# Patient Record
Sex: FEMALE | Race: WHITE | NOT HISPANIC OR LATINO | Employment: OTHER | URBAN - METROPOLITAN AREA
[De-identification: names, ages, dates, MRNs, and addresses within clinical notes are randomized per-mention and may not be internally consistent; named-entity substitution may affect disease eponyms.]

---

## 2018-03-09 ENCOUNTER — TELEPHONE (OUTPATIENT)
Dept: CARDIOLOGY | Facility: CLINIC | Age: 83
End: 2018-03-09

## 2018-03-12 ENCOUNTER — TELEPHONE (OUTPATIENT)
Dept: CARDIOLOGY | Facility: CLINIC | Age: 83
End: 2018-03-12

## 2018-03-12 RX ORDER — BUTALB/ACETAMINOPHEN/CAFFEINE 50-325-40
315 TABLET ORAL DAILY
COMMUNITY
Start: 2011-08-09 | End: 2018-03-16 | Stop reason: ENTERED-IN-ERROR

## 2018-03-12 RX ORDER — LEVOTHYROXINE SODIUM 100 UG/1
100 CAPSULE ORAL DAILY
COMMUNITY
Start: 2011-08-09

## 2018-03-12 RX ORDER — HYDROCHLOROTHIAZIDE 12.5 MG/1
12.5 TABLET ORAL DAILY
COMMUNITY
Start: 2011-08-09

## 2018-03-12 RX ORDER — GLUCOSAM/CHONDRO/HERB 149/HYAL 750-100 MG
1000 TABLET ORAL DAILY
COMMUNITY
Start: 2011-08-09 | End: 2018-03-16 | Stop reason: ENTERED-IN-ERROR

## 2018-03-12 RX ORDER — EZETIMIBE 10 MG/1
10 TABLET ORAL DAILY
COMMUNITY
Start: 2011-08-09 | End: 2018-03-16 | Stop reason: ENTERED-IN-ERROR

## 2018-03-12 RX ORDER — ASPIRIN 81 MG/1
81 TABLET ORAL DAILY
COMMUNITY
Start: 2011-08-09 | End: 2018-03-16 | Stop reason: ENTERED-IN-ERROR

## 2018-03-12 RX ORDER — CHOLECALCIFEROL (VITAMIN D3) 25 MCG
1000 TABLET ORAL 2 TIMES DAILY
COMMUNITY
Start: 2011-08-09

## 2018-03-12 NOTE — TELEPHONE ENCOUNTER
Left VM for pt to call the office. I need to r/s appt from 3/16 to 3/15/18.     Pt called and I r/s appt to 3/15 @ 8:30.

## 2018-03-13 ENCOUNTER — TELEPHONE (OUTPATIENT)
Dept: SCHEDULING | Facility: CLINIC | Age: 83
End: 2018-03-13

## 2018-03-13 NOTE — TELEPHONE ENCOUNTER
Pt of Dr. Henning.  Pt was originally scheduled for  A new pt (ccv) for 3/16 @ 9:15.  Pt was rescheduled for 3/15.  Pt has pre-adm testing on 3/16 @ 12:30pm.  Pt is having a surgical procedure with Dr. Giordano.  Dr. Giordano's office wants to know if she can come in on the 3/16, so she can have the ccv and then pre adm testing.  Please call Dr. Giordano's office with this info.  Thank you.

## 2018-03-16 ENCOUNTER — OFFICE VISIT (OUTPATIENT)
Dept: CARDIOLOGY | Facility: CLINIC | Age: 83
End: 2018-03-16
Payer: MEDICARE

## 2018-03-16 ENCOUNTER — TRANSCRIBE ORDERS (OUTPATIENT)
Dept: PREADMISSION TESTING | Facility: HOSPITAL | Age: 83
End: 2018-03-16

## 2018-03-16 ENCOUNTER — OFFICE VISIT (OUTPATIENT)
Dept: PREADMISSION TESTING | Facility: HOSPITAL | Age: 83
Setting detail: SURGERY ADMIT
End: 2018-03-16
Attending: ORTHOPAEDIC SURGERY
Payer: MEDICARE

## 2018-03-16 VITALS
BODY MASS INDEX: 29.66 KG/M2 | DIASTOLIC BLOOD PRESSURE: 60 MMHG | HEART RATE: 58 BPM | WEIGHT: 178 LBS | SYSTOLIC BLOOD PRESSURE: 124 MMHG | HEIGHT: 65 IN | RESPIRATION RATE: 12 BRPM

## 2018-03-16 VITALS
HEART RATE: 61 BPM | BODY MASS INDEX: 29.66 KG/M2 | TEMPERATURE: 96.4 F | HEIGHT: 65 IN | SYSTOLIC BLOOD PRESSURE: 160 MMHG | DIASTOLIC BLOOD PRESSURE: 74 MMHG | RESPIRATION RATE: 18 BRPM | WEIGHT: 178 LBS

## 2018-03-16 DIAGNOSIS — E78.9 LIPID DISORDER: ICD-10-CM

## 2018-03-16 DIAGNOSIS — R94.31 ABNORMAL EKG: ICD-10-CM

## 2018-03-16 DIAGNOSIS — I45.2 BIFASCICULAR BLOCK: ICD-10-CM

## 2018-03-16 DIAGNOSIS — G47.33 OSA (OBSTRUCTIVE SLEEP APNEA): ICD-10-CM

## 2018-03-16 DIAGNOSIS — Z01.818 PREOP EXAMINATION: Primary | ICD-10-CM

## 2018-03-16 DIAGNOSIS — I10 ESSENTIAL HYPERTENSION: ICD-10-CM

## 2018-03-16 DIAGNOSIS — I10 ESSENTIAL HYPERTENSION: Primary | ICD-10-CM

## 2018-03-16 DIAGNOSIS — M17.12 ARTHRITIS OF LEFT KNEE: ICD-10-CM

## 2018-03-16 DIAGNOSIS — E03.9 HYPOTHYROIDISM, UNSPECIFIED TYPE: ICD-10-CM

## 2018-03-16 DIAGNOSIS — M17.12 ARTHRITIS OF LEFT KNEE: Primary | ICD-10-CM

## 2018-03-16 DIAGNOSIS — Z01.810 PREPROCEDURAL CARDIOVASCULAR EXAMINATION: ICD-10-CM

## 2018-03-16 LAB
ANION GAP SERPL CALC-SCNC: 8 MEQ/L (ref 3–15)
BUN SERPL-MCNC: 13 MG/DL (ref 8–20)
CALCIUM SERPL-MCNC: 9.6 MG/DL (ref 8.9–10.3)
CHLORIDE SERPL-SCNC: 102 MMOL/L (ref 98–109)
CO2 SERPL-SCNC: 31 MMOL/L (ref 22–32)
CREAT SERPL-MCNC: 0.6 MG/DL (ref 0.6–1.1)
ERYTHROCYTE [DISTWIDTH] IN BLOOD BY AUTOMATED COUNT: 13.8 % (ref 11.7–14.4)
ERYTHROCYTE [SEDIMENTATION RATE] IN BLOOD BY WESTERGREN METHOD: 5 MM/HR
GFR SERPL CREATININE-BSD FRML MDRD: >60 ML/MIN/1.73M*2
GLUCOSE SERPL-MCNC: 91 MG/DL (ref 70–99)
HCT VFR BLDCO AUTO: 38.6 % (ref 35–45)
HGB BLD-MCNC: 13.3 G/DL (ref 11.8–15.7)
MCH RBC QN AUTO: 30.4 PG (ref 28–33.2)
MCHC RBC AUTO-ENTMCNC: 34.5 G/DL (ref 32.2–35.5)
MCV RBC AUTO: 88.3 FL (ref 83–98)
PDW BLD AUTO: 9.1 FL (ref 9.4–12.3)
PLATELET # BLD AUTO: 214 K/UL (ref 150–369)
POTASSIUM SERPL-SCNC: 3.5 MMOL/L (ref 3.6–5.1)
RBC # BLD AUTO: 4.37 M/UL (ref 3.93–5.22)
SODIUM SERPL-SCNC: 141 MMOL/L (ref 136–144)
WBC # BLD AUTO: 3.94 K/UL (ref 3.8–10.5)

## 2018-03-16 PROCEDURE — 36415 COLL VENOUS BLD VENIPUNCTURE: CPT | Mod: GZ

## 2018-03-16 PROCEDURE — 99203 OFFICE O/P NEW LOW 30 MIN: CPT | Performed by: INTERNAL MEDICINE

## 2018-03-16 PROCEDURE — 85652 RBC SED RATE AUTOMATED: CPT

## 2018-03-16 PROCEDURE — 87081 CULTURE SCREEN ONLY: CPT

## 2018-03-16 PROCEDURE — 93000 ELECTROCARDIOGRAM COMPLETE: CPT | Performed by: INTERNAL MEDICINE

## 2018-03-16 PROCEDURE — 80048 BASIC METABOLIC PNL TOTAL CA: CPT

## 2018-03-16 PROCEDURE — 99204 OFFICE O/P NEW MOD 45 MIN: CPT | Performed by: INTERNAL MEDICINE

## 2018-03-16 PROCEDURE — 85027 COMPLETE CBC AUTOMATED: CPT | Mod: GZ

## 2018-03-16 RX ORDER — BIOTIN 1 MG
1000 TABLET ORAL 3 TIMES DAILY
COMMUNITY

## 2018-03-16 RX ORDER — ESTRADIOL 0.1 MG/G
2 CREAM VAGINAL
Status: ON HOLD | COMMUNITY
End: 2018-04-04

## 2018-03-16 RX ORDER — MIRABEGRON 50 MG/1
50 TABLET, FILM COATED, EXTENDED RELEASE ORAL DAILY
COMMUNITY

## 2018-03-16 RX ORDER — ROSUVASTATIN CALCIUM 10 MG/1
10 TABLET, COATED ORAL DAILY
COMMUNITY

## 2018-03-16 RX ORDER — TRAZODONE HYDROCHLORIDE 50 MG/1
50 TABLET ORAL NIGHTLY
COMMUNITY

## 2018-03-16 RX ORDER — ASCORBIC ACID 1000 MG
TABLET ORAL
COMMUNITY

## 2018-03-16 ASSESSMENT — PAIN SCALES - GENERAL: PAINLEVEL: 2

## 2018-03-16 NOTE — ASSESSMENT & PLAN NOTE
Medical management and [place]-operative risk commentary noted as per this document's contents.

## 2018-03-16 NOTE — LETTER
"2018     Ariana Rothman MD  [address]  [facility] [place] [address]    Patient: Gale Campos   YOB: 1932   Date of Visit: 3/16/2018       Dear Dr. Rothman:    Thank you for referring Gale Campos to me for evaluation. Below are my notes for this consultation.    If you have questions, please do not hesitate to call me. I look forward to following your patient along with you.         Sincerely,        Eric Van, DO        CC: MD Eric Good, DO  3/18/2018  2:54 PM  Sign at close encounter       [facility]    Eric Van D.O., [facility]     3/18/2018      Re:  Gale Campos  : 1932    Dear Bartolo,    Thank you kindly for sending Gale to the office for cardiology consultation to render preoperative risk assessment as she prepares for elective left knee arthroplasty scheduled for April 3, 2018.      \"Tiana\" is a pleasant 85-year-old white female with a history of hypertension hyperlipidemia and noncritical coronary artery disease.  During her visit on , she mentioned that she undergo 2 separate stress test in 2017 at [facility] [place].  She believes the first 1 may have been abnormal which led to a second type of \"stress test\" but she was told everything was okay and no stents or bypass surgery were recommended.  It turns out through the wonderful Epic EMR, I was able to review Dr. Michele's note who found report of a coronary CT angiogram showing nonobstructive CAD.    Suffice to say, Tiana is limited mainly by her arthritis.  She denies symptoms of angina or exertional dyspnea performing moderate activity.  She denies palpitations lightheadedness stroke, TIA claudication orthopnea, PND or peripheral edema.    PAST MEDICAL, FAMILY AND SOCIAL HISTORY: Updated and entered into our EHR.  She has obstructive sleep apnea but does not wear a mask.  She has thyroid dysfunction.  Hypertension " "and hyperlipidemia as above.  She is .  She lives in an apartment.  She has 2 sons and a daughter.  She retired.  She did office work.  She never smoked.  She regularly exercises and lifts weights.  She has a living will.  Her mother  of old age her father  of throat cancer brother  of cancer but she is not sure the type.  One sister is alive without heart disease.    MEDICATIONS: Vitamin D3, estradiol cream, HCTZ 12.5 mg daily, levothyroxine, Myrbetriq, multivitamin, rosuvastatin 10 mg daily    REVIEW OF SYSTEMS: Aside from what is mentioned above, a 12 point review of systems was performed and was negative.    PHYSICAL EXAMINATION:  Vital Signs: /60   Pulse (!) 58   Resp 12   Ht 1.651 m (5' 5\")   Wt 80.7 kg (178 lb)   BMI 29.62 kg/m²  . .  General: Pleasant and in no acute distress.  HEENT: No corneal arcus or xanthelasmas.  Sclerae are anicteric.  Nares patent.  Mucous membranes moist.  Neck: Supple.  JVP is 4 cm/H2O.  Carotids are equal with no audible bruits.  No lymphadenopathy or thyromegaly.  Heart: Regular.  Normal S1 and S2.  No S4, S3 nor murmur.  Chest: Symmetrical.  Lungs: Clear bilaterally without rales, wheezes nor rhonchi.  Abdomen: Soft, nontender.  No masses or bruits.  No organomegaly.  Normal bowel sounds.  Extremities: No cyanosis, clubbing or edema.  Distal pulses are easily palpable.  Skin: Warm and dry and well perfused.  Neurologic: Alert and oriented ×3.  Cranial nerves II through XII are intact.  Psychiatric: normal mood, affect & judgment.    DIAGNOSTIC DATA:    EKG: Sinus bradycardia, left axis deviation with an LAFB/RBBB pattern.  Voltage criteria for LVH.  I have no prior tracings for comparison.    Labs:   Lab Results   Component Value Date     2018    K 3.5 (L) 2018    BUN 13 2018    CREATININE 0.6 2018    WBC 3.94 2018    HGB 13.3 2018     2018       Lipid Profile: None available    Coronary CT " "angiogram 8/15/17:  ·  Information obtained through \"Care Everywhere\" from [facility] shows the following: \"8/15/2017  Coronary CT chest images and report reviewed, radiologist interpretation follows:  1. There is no adenopathy in the visualized portions of the mediastinum and hilar regions. Right middle lobe pulmonary nodule measures 23 x 16 mm.   2. There is some groundglass opacity in the periphery of the lingula. There is linear atelectasis in the right middle lobe inferiorly. There is no filling defects within the pulmonary vessels to suggest pulmonary embolism.   3. Retrospective EKG gating was perfomed with IV contrast. Multiplanar reformatted images of the coronary artery was performed. The right coronary artery arises from the right sinus of valsalva. The left main coronary artery arises from the left sinus of valsalva.   4 The right coronary artery supplies the posterior descending coronary artery and is dominant.   5. Right coronary artery - there is no calcified or non calcified plaque.  6. Left main coronary artery - there is no calcified or non calcified plaque.  7. Left anterior descending coronary artery - there is no calcified or non calcified plaque  8. Left circumflex coronary artery - there is no calcified or non calcified plaque\"     Stress Cardiolite 7/14/17: Performed at [place].  Russel protocol 5 minutes, 85% target heart rate.  7 metastases.  GI uptake made inferior wall defect difficult and one could not exclude ischemia which led to the coronary CT angiogram.  EF 69%.      IMPRESSION/RECOMMENDATIONS:  1. Preoperative cardiac risk stratification prior to left knee arthroplasty -Tiana is an acceptable low risk for this orthopedic surgery.  Her RCRI risk to be less than 1% for major adverse cardiovascular event including nonfatal myocardial infarction or stroke and a 30 day.  Or death during the hospitalization.  She may proceed to the operating room without further need for cardiac testing since " she went went through a comprehensive evaluation with the team at [facility] in the summer 2017, less than 1 year ago.  Her coronary CT angiogram shows no obstructive CAD.  2. Bifascicular block with LAFB/RBBB pattern- I long discussion with margin the presence of her daughter.  I made a photocopy of her EKG so she could miniaturiz it and carry it for comparison in the future.  We talked about conduction disease.  I told her in the next 16 years it is possible she may warrant a pacemaker if she has worsening conduction disease which is typical of 80-year-old patients.  3. Hyperlipidemia-she is on rosuvastatin therapy.  4. Hypertension- blood pressure is well controlled and she is on HCTZ therapy.  5. Obstructive sleep apnea-she did not tolerate a sleep apnea mask.  6. Nonobstructive CAD by coronary CT angiogram August 2017.-noted.    Bartolo, thank you for allowing me to share in the care of your patient.  I asked her to return on an as-needed basis.  If you have any further questions, please do not hesitate to contact me.    Sincerely,    Eric Van DO  3/18/2018                  Eric Van DO  3/18/2018  2:58 PM  Sign at close encounter     Cardiology Note    Eric Van, ; FACC     Reason for visit: No chief complaint on file.     MOISE Campos is a 85 y.o. female who presents with       Past Medical History:   Diagnosis Date   • Dyspnea on exertion    • H/O exercise stress test     unclear - followed with nuclear stress test   • History of nephrolithiasis    • History of nuclear stress test evaluation for SOB- no interventions   • Hypertension    • Hypothyroidism    • Lipid disorder    • Neck pain    • Nodule of left lung     seen CT scan -8/2017- instructed for follow up in 3 months- at follow up appt  size decreased - pt instructed to follow up later date    • OAB (overactive bladder)     leakage - mybetriq and setrace cream   • JENNIFFER (obstructive sleep apnea)      positive sleep study- 3-5 yrs ago- pt non-compliant with CPAP     Past Surgical History:   Procedure Laterality Date   • APPENDECTOMY     • EYE SURGERY      Cataract Surgery OU   • HEMORRHOID SURGERY     • HEMORRHOID SURGERY     • JOINT REPLACEMENT      right  knee -2010   • OOPHORECTOMY       Social History     Social History Narrative   • No narrative on file     Family History   Problem Relation Age of Onset   • Hypertension Mother    • Cancer Father    • Cancer Brother      No known allergies  Current Outpatient Prescriptions   Medication Sig Dispense Refill   • cholecalciferol, vitamin D3, (cholecalciferol) 1,000 unit tablet Take 1,000 Units by mouth 2 (two) times a day.     • estradiol (ESTRACE) 0.01 % (0.1 mg/gram) vaginal cream Insert 2 g into the vagina 2 (two) times a day on Mondays, Wednesdays and Fridays.     • hydrochlorothiazide (HYDRODIURIL) 12.5 mg tablet Take 12.5 mg by mouth daily.     • levothyroxine sodium (TIROSINT) 100 mcg capsule Take 100 mcg by mouth daily.     • mirabegron (MYRBETRIQ) 50 mg ER tablet Take 50 mg by mouth daily.     • multivitamin tablet Take by mouth daily.     • rosuvastatin (CRESTOR) 10 mg tablet Take 10 mg by mouth daily.     • biotin 1 mg tablet Take 1,000 mcg by mouth 3 (three) times a day.     • ginkgo biloba 40 mg tablet Take by mouth.     • iron, carbonyl 25 mg iron tablet Take by mouth.     • traZODone (DESYREL) 50 mg tablet Take 50 mg by mouth nightly.       No current facility-administered medications for this visit.        ROS    Objective   Vitals:    03/16/18 1026   BP: 124/60   Pulse: (!) 58   Resp: 12       Physical Exam      Lab Results   Component Value Date    WBC 3.94 03/16/2018    HGB 13.3 03/16/2018     03/16/2018     03/16/2018    K 3.5 (L) 03/16/2018    BUN 13 03/16/2018    CREATININE 0.6 03/16/2018      ECG       Imaging         Assessment   Problem List Items Addressed This Visit     Essential hypertension - Primary    Relevant Orders     ECG 12 lead (Completed)    Preprocedural cardiovascular examination    Abnormal EKG    Bifascicular block              Eric Van, DO  3/17/2018

## 2018-03-16 NOTE — PRE-PROCEDURE INSTRUCTIONS
1. We will call you between 3 pm and 7 pm on April 2, 2018 to determine that arrival time for your procedure. If you do not hear by 6PM. Please call [phone] for arrival time.    2. Please report to [facility] [place] / [place]ie, walk into [employer] [employer] and report to the admission desk on first floor on the day of your procedure.   3. Please follow the following fasting guidelines:   NPO AFTER MIDNIGHT   4. Early on the morning of the procedure please take your usual dose of the listed medications with a sip of water: LEVOTHYROXINE, CRESTOR, MYBETRIQ       5. Other Instructions: CHG SHOWER /BACTROBAN OINTMENT INSTRUCTIONS GIVEN    6. If you develop a cold, cough, fever, rash, or other symptom prior to the data of the procedure, please report it to your physician immediately.   7. If you need to cancel the procedure for any reason, please contact your physician or call the unit listed above.   8. Make arrangements to have someone drive you home from the procedure. If you have not arranged for transportation home, your surgery may be cancelled.    9. You may not take public transportation unless accompanied by a responsible person.   10. You may not drive a car or operate complex or potentially dangerous machinery for 24 hours following anesthesia and/or sedation.   11. If it is medically necessary for you to have a longer stay, you will be informed as soon as the decision is made.   12. Do not wear or being anything of value to the hospital including jewelry of any kind. Do not wear make-up or contact lenses. DO bring your glasses and hearing aid.   13. No lotion, creams, powders, or oils on skin the morning of procedure    14. Dress in comfortable clothes.   15.  If instructed, please bring a copy of your Advanced Directive (Living Will/Durable Power of ) on the day of your procedure.      Pre operative instructions given as per protocol.  Form explained by: Ashly Patel RN     I have read and understand the  above information. I have had sufficient opportunity to ask questions I might have and they have been answered to my satisfaction. I agree to comply with the Patient Responsibilities listed above and have received a copy of this form.

## 2018-03-16 NOTE — ASSESSMENT & PLAN NOTE
Resume hydrochlorothiazide as long as no renal dysfunction, hypotension, nor volume depletion.  Postoperatively I would ensure she is not orthostatic.  I will  to use fall precautions.

## 2018-03-16 NOTE — CONSULTS
[facility] Medicine Service -  Pre-Operative Consultation       Patient Name: Gale Campos  Referring Surgeon: Bartolo Giordano    Reason for Referral: Pre-Operative Evaluation  Surgical Procedure: Left Total Knee Arthroplasty  Operative Date: 4/3/18  Other Providers:      PCP: Ariana Sue MD     Cardiology: Eric Van (seeing prior to surgery); she has previously followed with Dr. Russel Acevedo ([place])     HISTORY OF PRESENT ILLNESS      Gale Campos is a 85 y.o. female presenting today to the [facility] at [facility] for pre-operative evaluation prior to planned surgery.    The patient has a history of a prior right total knee replacement with Dr. Giordano several years ago.  She had no marked postoperative complications.  She reports now about 5 or so years of left-sided knee pain.  She is an aching pain centered around the knee without any marked radiation become increasingly severe in intensity, worse with activity, mildly relieved with rest, with no other symptoms.  She occasionally has swelling that then resolves on its own.    In regards to medical history:  · The patient has a history of hypertension and hyperlipidemia, medically managed.  · Patient has hypothyroidism medically managed.  · She has known sleep apnea with a prior positive sleep study but is not compliant secondary to mask intolerance.  · Patient has overactive bladder on Myrbetriq.  · Patient reports a prior cardiac workup elsewhere secondary to shortness of breath with exertion.  She had a nuclear stress test after having an exercise stress test.  The results are currently not in the system.  They are being requested by cardiology here.  She never warranted any further cardiac intervention.     The patient denies any current or recent chest pain or pressure, dyspnea, cough, sputum, fevers, chills, abdominal pain, nausea, vomiting, diarrhea or  other symptoms.   Functionally, the patient is able to ascend a flight or so of stairs, slowly, with no dyspnea or chest pain.     The patient denies, on specific questioning, the following:  No history of MI, arrhythmia,or CHF.  No history of DVT/PE.  No history of COPD.  No history of CVA.  No history of DM.   No history of CKD.     PAST MEDICAL AND SURGICAL HISTORY      Past Medical History:   Diagnosis Date   • Dyspnea on exertion    • H/O exercise stress test     unclear - followed with nuclear stress test   • History of nephrolithiasis    • History of nuclear stress test evaluation for SOB- no interventions   • Hypertension    • Hypothyroidism    • Lipid disorder    • Neck pain    • Nodule of left lung     seen CT scan -8/2017- instructed for follow up in 3 months- at follow up appt  size decreased - pt instructed to follow up later date    • OAB (overactive bladder)     leakage - mybetriq and setrace cream   • JENNIFFER (obstructive sleep apnea)     positive sleep study- 3-5 yrs ago- pt non-compliant with CPAP       Past Surgical History:   Procedure Laterality Date   • APPENDECTOMY     • EYE SURGERY      Cataract Surgery OU   • HEMORRHOID SURGERY     • HEMORRHOID SURGERY     • JOINT REPLACEMENT      right  knee -2010   • OOPHORECTOMY         MEDICATIONS        Current Outpatient Prescriptions:   •  biotin 1 mg tablet, Take 1,000 mcg by mouth 3 (three) times a day., Disp: , Rfl:   •  ginkgo biloba 40 mg tablet, Take by mouth., Disp: , Rfl:   •  iron, carbonyl 25 mg iron tablet, Take by mouth., Disp: , Rfl:   •  traZODone (DESYREL) 50 mg tablet, Take 50 mg by mouth nightly., Disp: , Rfl:   •  cholecalciferol, vitamin D3, (cholecalciferol) 1,000 unit tablet, Take 1,000 Units by mouth 2 (two) times a day., Disp: , Rfl:   •  estradiol (ESTRACE) 0.01 % (0.1 mg/gram) vaginal cream, Insert 2 g into the vagina 2 (two) times a day on Mondays, Wednesdays and Fridays., Disp: , Rfl:   •  hydrochlorothiazide (HYDRODIURIL) 12.5  "mg tablet, Take 12.5 mg by mouth daily., Disp: , Rfl:   •  levothyroxine sodium (TIROSINT) 100 mcg capsule, Take 100 mcg by mouth daily., Disp: , Rfl:   •  mirabegron (MYRBETRIQ) 50 mg ER tablet, Take 50 mg by mouth daily., Disp: , Rfl:   •  multivitamin tablet, Take by mouth daily., Disp: , Rfl:   •  rosuvastatin (CRESTOR) 10 mg tablet, Take 10 mg by mouth daily., Disp: , Rfl:     ALLERGIES      No known allergies    FAMILY HISTORY      family history includes Cancer in her brother and father; Hypertension in her mother.    Denies any prior known family history of DVTs/PEs/clotting disorder    SOCIAL HISTORY      Social History   Substance Use Topics   • Smoking status: Never Smoker   • Smokeless tobacco: Never Used   • Alcohol use Yes      Comment: rare     The patient resides in a single floor apartment not requiring stairs within the apartment nor to get into the apartment.    REVIEW OF SYSTEMS      All other systems reviewed and negative except as noted in HPI    PHYSICAL EXAMINATION      BP (!) 160/74 (BP Location: Left upper arm, Patient Position: Sitting)   Pulse 61   Temp (!) 35.8 °C (96.4 °F) (Oral)   Resp 18   Ht 1.651 m (5' 5\")   Wt 80.7 kg (178 lb)   BMI 29.62 kg/m²   Body mass index is 29.62 kg/m².  GEN: well-developed and well-nourished; not in acute distress  HEENT: normocephalic; atraumatic  NECK: no JVD; no bruits  CARDIO: regular rate and rhythm; no murmurs or rubs  RESP: clear to auscultation bilaterally; no rales, rhonchi, or wheezes  ABD: soft, non-distended, non-tender, normal bowel sounds  EXT: no cyanosis, clubbing, or edema  SKIN: clean, dry, warm, and intact  MUSCULOSKELETAL: no injury or deformity  NEURO: alert and oriented x 3; nonfocal  BEHAVIOR/EMOTIONAL: appropriate; cooperative    LABS / EKG        Labs  Lab Results   Component Value Date     03/16/2018    K 3.5 (L) 03/16/2018     03/16/2018    BUN 13 03/16/2018    CREATININE 0.6 03/16/2018    WBC 3.94 03/16/2018    " "HGB 13.3 03/16/2018    HCT 38.6 03/16/2018     03/16/2018       ECG/Telemetry  Sinus bradycardia; LAFB    ·  Information obtained through \"Care Everywhere\" from [facility] shows the following: \"8/15/2017  Coronary CT chest images and report reviewed, radiologist interpretation follows:  1. There is no adenopathy in the visualized portions of the mediastinum and hilar regions. Right middle lobe pulmonary nodule measures 23 x 16 mm.   2. There is some groundglass opacity in the periphery of the lingula. There is linear atelectasis in the right middle lobe inferiorly. There is no filling defects within the pulmonary vessels to suggest pulmonary embolism.   3. Retrospective EKG gating was perfomed with IV contrast. Multiplanar reformatted images of the coronary artery was performed. The right coronary artery arises from the right sinus of valsalva. The left main coronary artery arises from the left sinus of valsalva.   4 The right coronary artery supplies the posterior descending coronary artery and is dominant.   5. Right coronary artery - there is no calcified or non calcified plaque.  6. Left main coronary artery - there is no calcified or non calcified plaque.  7. Left anterior descending coronary artery - there is no calcified or non calcified plaque  8. Left circumflex coronary artery - there is no calcified or non calcified plaque\"    ASSESSMENT AND PLAN         Preop examination  Medical management and [place]-operative risk commentary noted as per this document's contents.      Osteoarthritis of left knee  Surgery as scheduled.    Essential hypertension  Resume hydrochlorothiazide as long as no renal dysfunction, hypotension, nor volume depletion.  Postoperatively I would ensure she is not orthostatic.  I will  to use fall precautions.    Hypothyroidism  Continue levothyroxine.    Lipid disorder  Continue Crestor.    OAB (overactive bladder)  Continue Myrbetriq and prescribed cream.    JENNIFFER (obstructive " sleep apnea)  I note the patient has JENNIFFER.  She is intolerant of her mask.  I would recommend use of our JENNIFFER protocol as JENNIFFER places the patient at relatively increased risk (compared to a population without this diagnosis) of oxygen desaturation, cardiac events, acute respiratory failure, or an ICU transfer.     Abnormal EKG  Per cardiology.    Bifascicular block  Per cardiology.    Nodule of left lung  This is followed up in the [place] lung nodule program there.  This is noted for the record.    Outpatient follow-up as previously planned.         In regards to perioperative cardiac risk:  Defer to Dr. Van's commentary here.     Further comments:  Resume supplements when OK with surgical team.  I would encourage incentive spirometry to assist with minimizing [place]-operative pulmonary risk.  DVT prophylaxis and timing of such per the discretion of the surgeon.     Please do not hesitate to contact [facility] during the upcoming hospitalization with any questions or concerns.     Zachary Michele MD  3/17/2018

## 2018-03-16 NOTE — ASSESSMENT & PLAN NOTE
This is followed up in the [place] lung nodule program there.  This is noted for the record.    Outpatient follow-up as previously planned.

## 2018-03-16 NOTE — PATIENT INSTRUCTIONS
1. Good luck with surgery on the left knee scheduled for/3/18 with Dr. Giordano  2. I will send him a copy of your acceptable clearance from a cardiac standpoint  3. Please print copy of EKG for patient to carry so she knows what her baseline EKG looks with the 2 electrical blockages.  4. Sign waiver for copy of 12-lead EKG and 2 cardiac stress tests performed with Russel Acevedo's done approximately August 2017.

## 2018-03-16 NOTE — ASSESSMENT & PLAN NOTE
I note the patient has JENNIFFER.  She is intolerant of her mask.  I would recommend use of our JENNIFFER protocol as JENNIFFER places the patient at relatively increased risk (compared to a population without this diagnosis) of oxygen desaturation, cardiac events, acute respiratory failure, or an ICU transfer.

## 2018-03-17 NOTE — PROGRESS NOTES
Cardiology Note    Eric Van, DO; FACC     Reason for visit: No chief complaint on file.     HPI   Gale Campos is a 85 y.o. female who presents with       Past Medical History:   Diagnosis Date   • Dyspnea on exertion    • H/O exercise stress test     unclear - followed with nuclear stress test   • History of nephrolithiasis    • History of nuclear stress test evaluation for SOB- no interventions   • Hypertension    • Hypothyroidism    • Lipid disorder    • Neck pain    • Nodule of left lung     seen CT scan -8/2017- instructed for follow up in 3 months- at follow up appt  size decreased - pt instructed to follow up later date    • OAB (overactive bladder)     leakage - mybetriq and setrace cream   • JENNIFFER (obstructive sleep apnea)     positive sleep study- 3-5 yrs ago- pt non-compliant with CPAP     Past Surgical History:   Procedure Laterality Date   • APPENDECTOMY     • EYE SURGERY      Cataract Surgery OU   • HEMORRHOID SURGERY     • HEMORRHOID SURGERY     • JOINT REPLACEMENT      right  knee -2010   • OOPHORECTOMY       Social History     Social History Narrative   • No narrative on file     Family History   Problem Relation Age of Onset   • Hypertension Mother    • Cancer Father    • Cancer Brother      No known allergies  Current Outpatient Prescriptions   Medication Sig Dispense Refill   • cholecalciferol, vitamin D3, (cholecalciferol) 1,000 unit tablet Take 1,000 Units by mouth 2 (two) times a day.     • estradiol (ESTRACE) 0.01 % (0.1 mg/gram) vaginal cream Insert 2 g into the vagina 2 (two) times a day on Mondays, Wednesdays and Fridays.     • hydrochlorothiazide (HYDRODIURIL) 12.5 mg tablet Take 12.5 mg by mouth daily.     • levothyroxine sodium (TIROSINT) 100 mcg capsule Take 100 mcg by mouth daily.     • mirabegron (MYRBETRIQ) 50 mg ER tablet Take 50 mg by mouth daily.     • multivitamin tablet Take by mouth daily.     • rosuvastatin (CRESTOR) 10 mg tablet Take 10 mg by mouth daily.      • biotin 1 mg tablet Take 1,000 mcg by mouth 3 (three) times a day.     • ginkgo biloba 40 mg tablet Take by mouth.     • iron, carbonyl 25 mg iron tablet Take by mouth.     • traZODone (DESYREL) 50 mg tablet Take 50 mg by mouth nightly.       No current facility-administered medications for this visit.        ROS    Objective   Vitals:    03/16/18 1026   BP: 124/60   Pulse: (!) 58   Resp: 12       Physical Exam      Lab Results   Component Value Date    WBC 3.94 03/16/2018    HGB 13.3 03/16/2018     03/16/2018     03/16/2018    K 3.5 (L) 03/16/2018    BUN 13 03/16/2018    CREATININE 0.6 03/16/2018      ECG       Imaging         Assessment   Problem List Items Addressed This Visit     Essential hypertension - Primary    Relevant Orders    ECG 12 lead (Completed)    Preprocedural cardiovascular examination    Abnormal EKG    Bifascicular block              Eric Van,   3/17/2018

## 2018-03-18 LAB — MICROORGANISM SPEC CULT: NORMAL

## 2018-03-18 NOTE — PROGRESS NOTES
"     [facility]    Eric Van D.O., [facility]     3/18/2018      Re:  Gale Campos  : 1932    Dear Bartolo,    Thank you kindly for sending Gale to the office for cardiology consultation to render preoperative risk assessment as she prepares for elective left knee arthroplasty scheduled for April 3, 2018.      \"Tiana\" is a pleasant 85-year-old white female with a history of hypertension hyperlipidemia and noncritical coronary artery disease.  During her visit on , she mentioned that she undergo 2 separate stress test in 2017 at [facility].  She believes the first 1 may have been abnormal which led to a second type of \"stress test\" but she was told everything was okay and no stents or bypass surgery were recommended.  It turns out through the wonderful Epic EMR, I was able to review Dr. Michele's note who found report of a coronary CT angiogram showing nonobstructive CAD.    Suffice to say, Tiana is limited mainly by her arthritis.  She denies symptoms of angina or exertional dyspnea performing moderate activity.  She denies palpitations lightheadedness stroke, TIA claudication orthopnea, PND or peripheral edema.    PAST MEDICAL, FAMILY AND SOCIAL HISTORY: Updated and entered into our EHR.  She has obstructive sleep apnea but does not wear a mask.  She has thyroid dysfunction.  Hypertension and hyperlipidemia as above.  She is .  She lives in an apartment.  She has 2 sons and a daughter.  She retired.  She did office work.  She never smoked.  She regularly exercises and lifts weights.  She has a living will.  Her mother  of old age her father  of throat cancer brother  of cancer but she is not sure the type.  One sister is alive without heart disease.    MEDICATIONS: Vitamin D3, estradiol cream, HCTZ 12.5 mg daily, levothyroxine, Myrbetriq, multivitamin, rosuvastatin 10 mg daily    REVIEW OF SYSTEMS: Aside from what is mentioned above, a 12 point " "review of systems was performed and was negative.    PHYSICAL EXAMINATION:  Vital Signs: /60   Pulse (!) 58   Resp 12   Ht 1.651 m (5' 5\")   Wt 80.7 kg (178 lb)   BMI 29.62 kg/m² . .  General: Pleasant and in no acute distress.  HEENT: No corneal arcus or xanthelasmas.  Sclerae are anicteric.  Nares patent.  Mucous membranes moist.  Neck: Supple.  JVP is 4 cm/H2O.  Carotids are equal with no audible bruits.  No lymphadenopathy or thyromegaly.  Heart: Regular.  Normal S1 and S2.  No S4, S3 nor murmur.  Chest: Symmetrical.  Lungs: Clear bilaterally without rales, wheezes nor rhonchi.  Abdomen: Soft, nontender.  No masses or bruits.  No organomegaly.  Normal bowel sounds.  Extremities: No cyanosis, clubbing or edema.  Distal pulses are easily palpable.  Skin: Warm and dry and well perfused.  Neurologic: Alert and oriented ×3.  Cranial nerves II through XII are intact.  Psychiatric: normal mood, affect & judgment.    DIAGNOSTIC DATA:    EKG: Sinus bradycardia, left axis deviation with an LAFB/RBBB pattern.  Voltage criteria for LVH.  I have no prior tracings for comparison.    Labs:   Lab Results   Component Value Date     03/16/2018    K 3.5 (L) 03/16/2018    BUN 13 03/16/2018    CREATININE 0.6 03/16/2018    WBC 3.94 03/16/2018    HGB 13.3 03/16/2018     03/16/2018       Lipid Profile: None available    Coronary CT angiogram 8/15/17:  ·  Information obtained through \"Care Everywhere\" from [facility] shows the following: \"8/15/2017  Coronary CT chest images and report reviewed, radiologist interpretation follows:  1. There is no adenopathy in the visualized portions of the mediastinum and hilar regions. Right middle lobe pulmonary nodule measures 23 x 16 mm.   2. There is some groundglass opacity in the periphery of the lingula. There is linear atelectasis in the right middle lobe inferiorly. There is no filling defects within the pulmonary vessels to suggest pulmonary embolism.   3. Retrospective EKG " "gating was perfomed with IV contrast. Multiplanar reformatted images of the coronary artery was performed. The right coronary artery arises from the right sinus of valsalva. The left main coronary artery arises from the left sinus of valsalva.   4 The right coronary artery supplies the posterior descending coronary artery and is dominant.   5. Right coronary artery - there is no calcified or non calcified plaque.  6. Left main coronary artery - there is no calcified or non calcified plaque.  7. Left anterior descending coronary artery - there is no calcified or non calcified plaque  8. Left circumflex coronary artery - there is no calcified or non calcified plaque\"     Stress Cardiolite 7/14/17: Performed at [facility].  Russel protocol 5 minutes, 85% target heart rate.  7 metastases.  GI uptake made inferior wall defect difficult and one could not exclude ischemia which led to the coronary CT angiogram.  EF 69%.      IMPRESSION/RECOMMENDATIONS:  1. Preoperative cardiac risk stratification prior to left knee arthroplasty -Tiana is an acceptable low risk for this orthopedic surgery.  Her RCRI risk to be less than 1% for major adverse cardiovascular event including nonfatal myocardial infarction or stroke and a 30 day.  Or death during the hospitalization.  She may proceed to the operating room without further need for cardiac testing since she went went through a comprehensive evaluation with the team at [facility] in the summer 2017, less than 1 year ago.  Her coronary CT angiogram shows no obstructive CAD.  2. Bifascicular block with LAFB/RBBB pattern- I long discussion with margin the presence of her daughter.  I made a photocopy of her EKG so she could miniaturiz it and carry it for comparison in the future.  We talked about conduction disease.  I told her in the next 16 years it is possible she may warrant a pacemaker if she has worsening conduction disease which is typical of 80-year-old " patients.  3. Hyperlipidemia-she is on rosuvastatin therapy.  4. Hypertension- blood pressure is well controlled and she is on HCTZ therapy.  5. Obstructive sleep apnea-she did not tolerate a sleep apnea mask.  6. Nonobstructive CAD by coronary CT angiogram August 2017.-noted.    Bartolo, thank you for allowing me to share in the care of your patient.  I asked her to return on an as-needed basis.  If you have any further questions, please do not hesitate to contact me.    Sincerely,    Eric Van, DO  3/18/2018

## 2018-03-20 NOTE — DISCHARGE INSTRUCTIONS
TOTAL KNEE REPLACEMENT DISCHARGE INSTRUCTIONS    You have just had a total knee replacement and it is important to follow these specific rules to ensure your safety.  Please look over these instructions before your discharge and ask questions about anything you do not understand.  Call [facility] at: # [phone] to schedule your post-operative for a post-operative appointment in 2-4 weeks with Dr. Giordano.  You will need a postoperative x-ray before your visit with Dr. Giordano which will be done in the office at our post-operative visit.    1) Continue to use your walker/crutches as much as possible, putting as much weight on your operated leg as is comfortable for you.  You may walk indoors or outdoors.  Take frequent short walks and increase distance gradually, with frequent rests periods when necessary.    2)  Climb stairs as you have been instructed. To go UP STAIRS, lead with your un-operated leg first. To go DOWN STAIRS, lead with your operated leg first.    3)  Do the exercises as instructed by Dr. Giordano and the physical therapist.    4) No tub bath.  You may shower as long as the incision is not draining. Your incision may be washed with mild soap and water but do not use deodorant soaps. Do not rub the incision; just let the water run over it.  Lightly pat dry with a towel.    5) No ointments or creams on incision site for 6 weeks    6) Do not cover incision unless it is draining.      7) Only short car rides after until your first post-operative visit after discharge are permitted.  No driving for at least 3 to 4 weeks after surgery.    8) Take only the medications prescribed by Dr Giordano listed on your discharge medication list.    9)   Ice area several times daily on the inside and outside of the knee as well as the thigh of the operated leg.  Do not place ice directly on incision site.     10)   Elevate your operative leg with pillow under your ankle (not under knee) to  help reduce swelling and help with extension.    11)   Call Dr. Giordano’s office at [phone] or:  • Increased pain, redness, warmth, swelling, or drainage from your incision  • Temperature elevation above 100.5 degrees    12)    will help you arrange your equipment for discharge.  Let your nurse know if the  has not contacted you prior to discharge.    13)   Whenever you have an operative or invasive procedure (dental, urologic, podiatric, etc.), check with your physician to remind her/him that you have a total knee prosthesis and may need antibiotics before the procedure.  This will be reviewed at your first post-op visit.    Questions? - Call Christy Arias RN - # [phone]

## 2018-03-30 ENCOUNTER — TELEPHONE (OUTPATIENT)
Dept: SCHEDULING | Facility: CLINIC | Age: 83
End: 2018-03-30

## 2018-03-30 NOTE — TELEPHONE ENCOUNTER
Dr. Juvencio Hyman from [facility]. Called in requesting the CCV note from pt 3/16 CCV with Dr. Henning pt is scheduled for left total knee replacement on 4/3 please fax to [phone] if needed Yenni can be reached at the number provided.

## 2018-04-02 ENCOUNTER — ANESTHESIA EVENT (OUTPATIENT)
Dept: OPERATING ROOM | Facility: HOSPITAL | Age: 83
Setting detail: SURGERY ADMIT
End: 2018-04-02
Payer: MEDICARE

## 2018-04-03 ENCOUNTER — HOSPITAL ENCOUNTER (OUTPATIENT)
Facility: HOSPITAL | Age: 83
Discharge: HOME | End: 2018-04-04
Attending: ORTHOPAEDIC SURGERY | Admitting: ORTHOPAEDIC SURGERY
Payer: MEDICARE

## 2018-04-03 ENCOUNTER — APPOINTMENT (OUTPATIENT)
Dept: PHYSICAL THERAPY | Facility: HOSPITAL | Age: 83
End: 2018-04-03
Attending: PHYSICIAN ASSISTANT
Payer: MEDICARE

## 2018-04-03 ENCOUNTER — APPOINTMENT (OUTPATIENT)
Dept: RADIOLOGY | Facility: HOSPITAL | Age: 83
Setting detail: SURGERY ADMIT
End: 2018-04-03
Attending: PHYSICIAN ASSISTANT
Payer: MEDICARE

## 2018-04-03 ENCOUNTER — ANESTHESIA (OUTPATIENT)
Dept: OPERATING ROOM | Facility: HOSPITAL | Age: 83
Setting detail: SURGERY ADMIT
End: 2018-04-03
Payer: MEDICARE

## 2018-04-03 DIAGNOSIS — I10 ESSENTIAL HYPERTENSION: Primary | ICD-10-CM

## 2018-04-03 PROBLEM — M17.9 OA (OSTEOARTHRITIS) OF KNEE: Status: ACTIVE | Noted: 2018-04-03

## 2018-04-03 PROCEDURE — G8978 MOBILITY CURRENT STATUS: HCPCS | Mod: GP,CI

## 2018-04-03 PROCEDURE — G8979 MOBILITY GOAL STATUS: HCPCS | Mod: GP,CI

## 2018-04-03 PROCEDURE — C1776 JOINT DEVICE (IMPLANTABLE): HCPCS | Performed by: ORTHOPAEDIC SURGERY

## 2018-04-03 PROCEDURE — 25800000 HC PHARMACY IV SOLUTIONS: Performed by: PHYSICIAN ASSISTANT

## 2018-04-03 PROCEDURE — 71000001 HC PACU PHASE 1 INITIAL 30MIN: Performed by: ORTHOPAEDIC SURGERY

## 2018-04-03 PROCEDURE — 63700000 HC SELF-ADMINISTRABLE DRUG: Performed by: PHYSICIAN ASSISTANT

## 2018-04-03 PROCEDURE — C1713 ANCHOR/SCREW BN/BN,TIS/BN: HCPCS | Performed by: ORTHOPAEDIC SURGERY

## 2018-04-03 PROCEDURE — 27200000 HC STERILE SUPPLY: Performed by: ORTHOPAEDIC SURGERY

## 2018-04-03 PROCEDURE — 97161 PT EVAL LOW COMPLEX 20 MIN: CPT | Mod: GP

## 2018-04-03 PROCEDURE — 25000000 HC PHARMACY GENERAL: Performed by: PHYSICIAN ASSISTANT

## 2018-04-03 PROCEDURE — 37000010 ANESTHESIA SPINAL BLOCK: Performed by: ANESTHESIOLOGY

## 2018-04-03 PROCEDURE — 36000015 HC OR LEVEL 5 EA ADDL MIN: Performed by: ORTHOPAEDIC SURGERY

## 2018-04-03 PROCEDURE — 0SRD0J9 REPLACEMENT OF LEFT KNEE JOINT WITH SYNTHETIC SUBSTITUTE, CEMENTED, OPEN APPROACH: ICD-10-PCS | Performed by: ORTHOPAEDIC SURGERY

## 2018-04-03 PROCEDURE — 99232 SBSQ HOSP IP/OBS MODERATE 35: CPT | Performed by: INTERNAL MEDICINE

## 2018-04-03 PROCEDURE — 25000000 HC PHARMACY GENERAL: Performed by: ORTHOPAEDIC SURGERY

## 2018-04-03 PROCEDURE — 25800000 HC PHARMACY IV SOLUTIONS: Performed by: NURSE ANESTHETIST, CERTIFIED REGISTERED

## 2018-04-03 PROCEDURE — 73560 X-RAY EXAM OF KNEE 1 OR 2: CPT | Mod: LT

## 2018-04-03 PROCEDURE — G8978 MOBILITY CURRENT STATUS: HCPCS | Mod: GP,CJ

## 2018-04-03 PROCEDURE — 36000005 HC OR LEVEL 5 INITIAL 30MIN: Performed by: ORTHOPAEDIC SURGERY

## 2018-04-03 PROCEDURE — 63600000 HC DRUGS/DETAIL CODE: Performed by: PHYSICIAN ASSISTANT

## 2018-04-03 PROCEDURE — 37000010 HC ANESTHESIA SPINAL: Performed by: ORTHOPAEDIC SURGERY

## 2018-04-03 PROCEDURE — 63600000 HC DRUGS/DETAIL CODE: Performed by: NURSE ANESTHETIST, CERTIFIED REGISTERED

## 2018-04-03 PROCEDURE — 71000011 HC PACU PHASE 1 EA ADDL MIN: Performed by: ORTHOPAEDIC SURGERY

## 2018-04-03 PROCEDURE — S0028 INJECTION, FAMOTIDINE, 20 MG: HCPCS | Performed by: NURSE ANESTHETIST, CERTIFIED REGISTERED

## 2018-04-03 PROCEDURE — 25000000 HC PHARMACY GENERAL: Performed by: NURSE ANESTHETIST, CERTIFIED REGISTERED

## 2018-04-03 DEVICE — CEMENT BONE PALACOS RADIOPAQUE: Type: IMPLANTABLE DEVICE | Status: FUNCTIONAL

## 2018-04-03 DEVICE — IMPLANTABLE DEVICE: Type: IMPLANTABLE DEVICE | Status: FUNCTIONAL

## 2018-04-03 DEVICE — PATELLA COMPONENT 32MM: Type: IMPLANTABLE DEVICE | Site: KNEE | Status: FUNCTIONAL

## 2018-04-03 DEVICE — SURFACE ARTICULAR NEXGEN FLEX EF 10MM: Type: IMPLANTABLE DEVICE | Site: KNEE | Status: FUNCTIONAL

## 2018-04-03 DEVICE — STEM TIBIAL COMPONENT NEXGEN #3: Type: IMPLANTABLE DEVICE | Site: KNEE | Status: FUNCTIONAL

## 2018-04-03 DEVICE — COMPONENT FEMORAL LPS FLEX GSF SZ E-L: Type: IMPLANTABLE DEVICE | Site: KNEE | Status: FUNCTIONAL

## 2018-04-03 DEVICE — PLUG STEM NEXGEN TAPER: Type: IMPLANTABLE DEVICE | Status: FUNCTIONAL

## 2018-04-03 DEVICE — SCREW NEXGEN COMPLETE KNEE STEM EXTENSION REPLACEMENT: Type: IMPLANTABLE DEVICE | Status: FUNCTIONAL

## 2018-04-03 RX ORDER — TRAMADOL HYDROCHLORIDE 50 MG/1
50-100 TABLET ORAL EVERY 6 HOURS PRN
Status: DISCONTINUED | OUTPATIENT
Start: 2018-04-03 | End: 2018-04-04 | Stop reason: HOSPADM

## 2018-04-03 RX ORDER — CHOLECALCIFEROL (VITAMIN D3) 25 MCG
1000 TABLET ORAL 2 TIMES DAILY
Status: DISCONTINUED | OUTPATIENT
Start: 2018-04-03 | End: 2018-04-04 | Stop reason: HOSPADM

## 2018-04-03 RX ORDER — FAMOTIDINE 10 MG/ML
INJECTION INTRAVENOUS AS NEEDED
Status: DISCONTINUED | OUTPATIENT
Start: 2018-04-03 | End: 2018-04-03 | Stop reason: SURG

## 2018-04-03 RX ORDER — ROSUVASTATIN CALCIUM 10 MG/1
10 TABLET, COATED ORAL
Status: DISCONTINUED | OUTPATIENT
Start: 2018-04-03 | End: 2018-04-04 | Stop reason: HOSPADM

## 2018-04-03 RX ORDER — DIPHENHYDRAMINE HYDROCHLORIDE 50 MG/ML
25 INJECTION INTRAMUSCULAR; INTRAVENOUS EVERY 6 HOURS PRN
Status: DISCONTINUED | OUTPATIENT
Start: 2018-04-03 | End: 2018-04-04 | Stop reason: HOSPADM

## 2018-04-03 RX ORDER — ONDANSETRON 4 MG/1
4 TABLET, ORALLY DISINTEGRATING ORAL
Status: DISCONTINUED | OUTPATIENT
Start: 2018-04-03 | End: 2018-04-04 | Stop reason: HOSPADM

## 2018-04-03 RX ORDER — ACETAMINOPHEN 325 MG/1
975 TABLET ORAL ONCE
Status: COMPLETED | OUTPATIENT
Start: 2018-04-03 | End: 2018-04-03

## 2018-04-03 RX ORDER — HYDROCHLOROTHIAZIDE 12.5 MG/1
12.5 TABLET ORAL DAILY
Status: DISCONTINUED | OUTPATIENT
Start: 2018-04-03 | End: 2018-04-03

## 2018-04-03 RX ORDER — PROPOFOL 10 MG/ML
INJECTION, EMULSION INTRAVENOUS CONTINUOUS PRN
Status: DISCONTINUED | OUTPATIENT
Start: 2018-04-03 | End: 2018-04-03 | Stop reason: SURG

## 2018-04-03 RX ORDER — HYDRALAZINE HYDROCHLORIDE 20 MG/ML
10 INJECTION INTRAMUSCULAR; INTRAVENOUS EVERY 4 HOURS PRN
Status: DISCONTINUED | OUTPATIENT
Start: 2018-04-03 | End: 2018-04-04 | Stop reason: HOSPADM

## 2018-04-03 RX ORDER — ONDANSETRON HYDROCHLORIDE 2 MG/ML
INJECTION, SOLUTION INTRAVENOUS AS NEEDED
Status: DISCONTINUED | OUTPATIENT
Start: 2018-04-03 | End: 2018-04-03 | Stop reason: SURG

## 2018-04-03 RX ORDER — ACETAMINOPHEN 325 MG/1
650 TABLET ORAL EVERY 4 HOURS PRN
Status: DISCONTINUED | OUTPATIENT
Start: 2018-04-03 | End: 2018-04-04 | Stop reason: HOSPADM

## 2018-04-03 RX ORDER — POLYETHYLENE GLYCOL 3350 17 G/17G
17 POWDER, FOR SOLUTION ORAL DAILY
Status: DISCONTINUED | OUTPATIENT
Start: 2018-04-03 | End: 2018-04-04 | Stop reason: HOSPADM

## 2018-04-03 RX ORDER — DEXTROSE MONOHYDRATE, SODIUM CHLORIDE, AND POTASSIUM CHLORIDE 50; 1.49; 9 G/1000ML; G/1000ML; G/1000ML
INJECTION, SOLUTION INTRAVENOUS CONTINUOUS
Status: DISCONTINUED | OUTPATIENT
Start: 2018-04-03 | End: 2018-04-04 | Stop reason: HOSPADM

## 2018-04-03 RX ORDER — DIPHENHYDRAMINE HCL 25 MG
25 CAPSULE ORAL EVERY 6 HOURS PRN
Status: DISCONTINUED | OUTPATIENT
Start: 2018-04-03 | End: 2018-04-04 | Stop reason: HOSPADM

## 2018-04-03 RX ORDER — METOCLOPRAMIDE HYDROCHLORIDE 5 MG/ML
INJECTION INTRAMUSCULAR; INTRAVENOUS AS NEEDED
Status: DISCONTINUED | OUTPATIENT
Start: 2018-04-03 | End: 2018-04-03 | Stop reason: SURG

## 2018-04-03 RX ORDER — AMOXICILLIN 250 MG
1 CAPSULE ORAL 2 TIMES DAILY
Status: DISCONTINUED | OUTPATIENT
Start: 2018-04-03 | End: 2018-04-04 | Stop reason: HOSPADM

## 2018-04-03 RX ORDER — DEXTROSE 50 % IN WATER (D50W) INTRAVENOUS SYRINGE
25 AS NEEDED
Status: DISCONTINUED | OUTPATIENT
Start: 2018-04-03 | End: 2018-04-04 | Stop reason: HOSPADM

## 2018-04-03 RX ORDER — HYDROMORPHONE HYDROCHLORIDE 2 MG/ML
0.5 INJECTION, SOLUTION INTRAMUSCULAR; INTRAVENOUS; SUBCUTANEOUS
Status: DISCONTINUED | OUTPATIENT
Start: 2018-04-03 | End: 2018-04-03 | Stop reason: HOSPADM

## 2018-04-03 RX ORDER — LEVOTHYROXINE SODIUM 100 UG/1
100 CAPSULE ORAL DAILY
Status: DISCONTINUED | OUTPATIENT
Start: 2018-04-03 | End: 2018-04-04

## 2018-04-03 RX ORDER — DEXAMETHASONE SODIUM PHOSPHATE 4 MG/ML
6 INJECTION, SOLUTION INTRA-ARTICULAR; INTRALESIONAL; INTRAMUSCULAR; INTRAVENOUS; SOFT TISSUE ONCE
Status: COMPLETED | OUTPATIENT
Start: 2018-04-04 | End: 2018-04-04

## 2018-04-03 RX ORDER — ACETAMINOPHEN 650 MG/1
650 SUPPOSITORY RECTAL EVERY 4 HOURS PRN
Status: DISCONTINUED | OUTPATIENT
Start: 2018-04-03 | End: 2018-04-04 | Stop reason: HOSPADM

## 2018-04-03 RX ORDER — IBUPROFEN 200 MG
16-32 TABLET ORAL AS NEEDED
Status: DISCONTINUED | OUTPATIENT
Start: 2018-04-03 | End: 2018-04-04 | Stop reason: HOSPADM

## 2018-04-03 RX ORDER — MORPHINE SULFATE 10 MG/ML
2-4 INJECTION INTRAVENOUS EVERY 4 HOURS PRN
Status: DISCONTINUED | OUTPATIENT
Start: 2018-04-03 | End: 2018-04-04 | Stop reason: HOSPADM

## 2018-04-03 RX ORDER — CEFAZOLIN SODIUM/WATER 1 G/10 ML
2 SYRINGE (ML) INTRAVENOUS
Status: COMPLETED | OUTPATIENT
Start: 2018-04-03 | End: 2018-04-04

## 2018-04-03 RX ORDER — ALUMINUM HYDROXIDE, MAGNESIUM HYDROXIDE, AND SIMETHICONE 1200; 120; 1200 MG/30ML; MG/30ML; MG/30ML
30 SUSPENSION ORAL EVERY 4 HOURS PRN
Status: DISCONTINUED | OUTPATIENT
Start: 2018-04-03 | End: 2018-04-04 | Stop reason: HOSPADM

## 2018-04-03 RX ORDER — BUPIVACAINE HYDROCHLORIDE AND EPINEPHRINE 5; 5 MG/ML; UG/ML
INJECTION, SOLUTION EPIDURAL; INTRACAUDAL; PERINEURAL AS NEEDED
Status: DISCONTINUED | OUTPATIENT
Start: 2018-04-03 | End: 2018-04-03 | Stop reason: HOSPADM

## 2018-04-03 RX ORDER — KETOROLAC TROMETHAMINE 30 MG/ML
INJECTION, SOLUTION INTRAMUSCULAR; INTRAVENOUS AS NEEDED
Status: DISCONTINUED | OUTPATIENT
Start: 2018-04-03 | End: 2018-04-03 | Stop reason: SURG

## 2018-04-03 RX ORDER — NAPROXEN SODIUM 220 MG/1
81 TABLET, FILM COATED ORAL 2 TIMES DAILY
Status: DISCONTINUED | OUTPATIENT
Start: 2018-04-03 | End: 2018-04-04 | Stop reason: HOSPADM

## 2018-04-03 RX ORDER — KETOROLAC TROMETHAMINE 15 MG/ML
15 INJECTION, SOLUTION INTRAMUSCULAR; INTRAVENOUS ONCE
Status: ACTIVE | OUTPATIENT
Start: 2018-04-03 | End: 2018-04-04

## 2018-04-03 RX ORDER — ONDANSETRON HYDROCHLORIDE 2 MG/ML
4 INJECTION, SOLUTION INTRAVENOUS EVERY 8 HOURS PRN
Status: DISCONTINUED | OUTPATIENT
Start: 2018-04-03 | End: 2018-04-04 | Stop reason: HOSPADM

## 2018-04-03 RX ORDER — KETOROLAC TROMETHAMINE 30 MG/ML
7.5 INJECTION, SOLUTION INTRAMUSCULAR; INTRAVENOUS EVERY 6 HOURS
Status: DISCONTINUED | OUTPATIENT
Start: 2018-04-03 | End: 2018-04-04 | Stop reason: HOSPADM

## 2018-04-03 RX ORDER — ACETAMINOPHEN 650 MG/20.3ML
650 LIQUID ORAL EVERY 4 HOURS PRN
Status: DISCONTINUED | OUTPATIENT
Start: 2018-04-03 | End: 2018-04-04 | Stop reason: HOSPADM

## 2018-04-03 RX ORDER — HYDROCHLOROTHIAZIDE 12.5 MG/1
12.5 TABLET ORAL DAILY
Status: DISCONTINUED | OUTPATIENT
Start: 2018-04-04 | End: 2018-04-03

## 2018-04-03 RX ORDER — DEXAMETHASONE SODIUM PHOSPHATE 4 MG/ML
INJECTION, SOLUTION INTRA-ARTICULAR; INTRALESIONAL; INTRAMUSCULAR; INTRAVENOUS; SOFT TISSUE AS NEEDED
Status: DISCONTINUED | OUTPATIENT
Start: 2018-04-03 | End: 2018-04-03 | Stop reason: SURG

## 2018-04-03 RX ORDER — BISACODYL 10 MG/1
10 SUPPOSITORY RECTAL DAILY PRN
Status: DISCONTINUED | OUTPATIENT
Start: 2018-04-03 | End: 2018-04-04 | Stop reason: HOSPADM

## 2018-04-03 RX ORDER — DEXTROSE 40 %
15-30 GEL (GRAM) ORAL AS NEEDED
Status: DISCONTINUED | OUTPATIENT
Start: 2018-04-03 | End: 2018-04-04 | Stop reason: HOSPADM

## 2018-04-03 RX ORDER — TRAZODONE HYDROCHLORIDE 50 MG/1
50 TABLET ORAL NIGHTLY
Status: DISCONTINUED | OUTPATIENT
Start: 2018-04-03 | End: 2018-04-04 | Stop reason: HOSPADM

## 2018-04-03 RX ORDER — PANTOPRAZOLE SODIUM 40 MG/1
40 TABLET, DELAYED RELEASE ORAL
Status: DISCONTINUED | OUTPATIENT
Start: 2018-04-04 | End: 2018-04-04 | Stop reason: HOSPADM

## 2018-04-03 RX ORDER — DEXTROSE 50 % IN WATER (D50W) INTRAVENOUS SYRINGE
25 AS NEEDED
Status: DISCONTINUED | OUTPATIENT
Start: 2018-04-03 | End: 2018-04-03 | Stop reason: HOSPADM

## 2018-04-03 RX ORDER — BIOTIN 1 MG
1000 TABLET ORAL 3 TIMES DAILY
Status: DISCONTINUED | OUTPATIENT
Start: 2018-04-03 | End: 2018-04-03

## 2018-04-03 RX ORDER — POLYETHYLENE GLYCOL 3350 17 G/17G
17 POWDER, FOR SOLUTION ORAL DAILY PRN
Status: DISCONTINUED | OUTPATIENT
Start: 2018-04-03 | End: 2018-04-04 | Stop reason: HOSPADM

## 2018-04-03 RX ORDER — HYDROCHLOROTHIAZIDE 12.5 MG/1
12.5 TABLET ORAL DAILY
Status: DISCONTINUED | OUTPATIENT
Start: 2018-04-03 | End: 2018-04-04 | Stop reason: HOSPADM

## 2018-04-03 RX ORDER — IBUPROFEN 200 MG
16-32 TABLET ORAL AS NEEDED
Status: DISCONTINUED | OUTPATIENT
Start: 2018-04-03 | End: 2018-04-03 | Stop reason: HOSPADM

## 2018-04-03 RX ORDER — DEXTROSE 40 %
15-30 GEL (GRAM) ORAL AS NEEDED
Status: DISCONTINUED | OUTPATIENT
Start: 2018-04-03 | End: 2018-04-03 | Stop reason: HOSPADM

## 2018-04-03 RX ORDER — ACETAMINOPHEN 325 MG/1
650 TABLET ORAL
Status: DISCONTINUED | OUTPATIENT
Start: 2018-04-03 | End: 2018-04-04 | Stop reason: HOSPADM

## 2018-04-03 RX ORDER — SODIUM CHLORIDE 9 MG/ML
INJECTION, SOLUTION INTRAVENOUS CONTINUOUS PRN
Status: DISCONTINUED | OUTPATIENT
Start: 2018-04-03 | End: 2018-04-03 | Stop reason: SURG

## 2018-04-03 RX ORDER — OXYCODONE HYDROCHLORIDE 5 MG/1
5-10 TABLET ORAL EVERY 4 HOURS PRN
Status: DISCONTINUED | OUTPATIENT
Start: 2018-04-03 | End: 2018-04-04 | Stop reason: HOSPADM

## 2018-04-03 RX ORDER — ONDANSETRON HYDROCHLORIDE 2 MG/ML
4 INJECTION, SOLUTION INTRAVENOUS
Status: DISCONTINUED | OUTPATIENT
Start: 2018-04-03 | End: 2018-04-03 | Stop reason: HOSPADM

## 2018-04-03 RX ORDER — SENNOSIDES 8.6 MG/1
1 TABLET ORAL 2 TIMES DAILY PRN
Status: DISCONTINUED | OUTPATIENT
Start: 2018-04-03 | End: 2018-04-04 | Stop reason: HOSPADM

## 2018-04-03 RX ORDER — FENTANYL CITRATE 50 UG/ML
50 INJECTION, SOLUTION INTRAMUSCULAR; INTRAVENOUS
Status: DISCONTINUED | OUTPATIENT
Start: 2018-04-03 | End: 2018-04-03 | Stop reason: HOSPADM

## 2018-04-03 RX ORDER — PROPOFOL 10 MG/ML
INJECTION, EMULSION INTRAVENOUS AS NEEDED
Status: DISCONTINUED | OUTPATIENT
Start: 2018-04-03 | End: 2018-04-03 | Stop reason: SURG

## 2018-04-03 RX ORDER — ONDANSETRON 4 MG/1
4 TABLET, ORALLY DISINTEGRATING ORAL EVERY 8 HOURS PRN
Status: DISCONTINUED | OUTPATIENT
Start: 2018-04-03 | End: 2018-04-04 | Stop reason: HOSPADM

## 2018-04-03 RX ORDER — CEFAZOLIN SODIUM/WATER 1 G/10 ML
2 SYRINGE (ML) INTRAVENOUS
Status: DISCONTINUED | OUTPATIENT
Start: 2018-04-03 | End: 2018-04-03

## 2018-04-03 RX ORDER — LIDOCAINE HYDROCHLORIDE 10 MG/ML
INJECTION, SOLUTION INFILTRATION; PERINEURAL AS NEEDED
Status: DISCONTINUED | OUTPATIENT
Start: 2018-04-03 | End: 2018-04-03 | Stop reason: SURG

## 2018-04-03 RX ORDER — CEFAZOLIN SODIUM/WATER 1 G/10 ML
2 SYRINGE (ML) INTRAVENOUS
Status: COMPLETED | OUTPATIENT
Start: 2018-04-03 | End: 2018-04-03

## 2018-04-03 RX ADMIN — ASPIRIN 81 MG: 81 TABLET, CHEWABLE ORAL at 20:54

## 2018-04-03 RX ADMIN — ONDANSETRON 4 MG: 2 INJECTION INTRAMUSCULAR; INTRAVENOUS at 09:42

## 2018-04-03 RX ADMIN — MULTIPLE VITAMINS W/ MINERALS TAB 1 TABLET: TAB at 18:39

## 2018-04-03 RX ADMIN — ACETAMINOPHEN 650 MG: 325 TABLET ORAL at 12:18

## 2018-04-03 RX ADMIN — SODIUM CHLORIDE: 9 INJECTION, SOLUTION INTRAVENOUS at 07:56

## 2018-04-03 RX ADMIN — FAMOTIDINE 20 MG: 10 INJECTION, SOLUTION INTRAVENOUS at 08:01

## 2018-04-03 RX ADMIN — TRAMADOL HYDROCHLORIDE 50 MG: 50 TABLET, COATED ORAL at 22:22

## 2018-04-03 RX ADMIN — KETOROLAC TROMETHAMINE 7.5 MG: 30 INJECTION, SOLUTION INTRAMUSCULAR at 09:42

## 2018-04-03 RX ADMIN — POTASSIUM CHLORIDE, DEXTROSE MONOHYDRATE AND SODIUM CHLORIDE: 150; 5; 900 INJECTION, SOLUTION INTRAVENOUS at 19:21

## 2018-04-03 RX ADMIN — VITAMIN D, TAB 1000IU (100/BT) 1000 UNITS: 25 TAB at 20:57

## 2018-04-03 RX ADMIN — Medication 2 G: at 16:46

## 2018-04-03 RX ADMIN — ACETAMINOPHEN 975 MG: 325 TABLET ORAL at 06:48

## 2018-04-03 RX ADMIN — HYDROCHLOROTHIAZIDE 12.5 MG: 12.5 TABLET ORAL at 16:42

## 2018-04-03 RX ADMIN — TRAMADOL HYDROCHLORIDE 50 MG: 50 TABLET, COATED ORAL at 12:18

## 2018-04-03 RX ADMIN — PROPOFOL 50 MCG/KG/MIN: 10 INJECTION, EMULSION INTRAVENOUS at 08:10

## 2018-04-03 RX ADMIN — METOCLOPRAMIDE 10 MG: 5 INJECTION, SOLUTION INTRAMUSCULAR; INTRAVENOUS at 08:01

## 2018-04-03 RX ADMIN — Medication 2 G: at 08:20

## 2018-04-03 RX ADMIN — KETOROLAC TROMETHAMINE 7.5 MG: 30 INJECTION, SOLUTION INTRAMUSCULAR at 20:55

## 2018-04-03 RX ADMIN — ROSUVASTATIN CALCIUM 10 MG: 10 TABLET, FILM COATED ORAL at 18:39

## 2018-04-03 RX ADMIN — LIDOCAINE HYDROCHLORIDE 5 ML: 10 INJECTION, SOLUTION INFILTRATION; PERINEURAL at 08:13

## 2018-04-03 RX ADMIN — KETOROLAC TROMETHAMINE 7.5 MG: 30 INJECTION, SOLUTION INTRAMUSCULAR at 14:53

## 2018-04-03 RX ADMIN — ACETAMINOPHEN 650 MG: 325 TABLET ORAL at 18:39

## 2018-04-03 RX ADMIN — DEXAMETHASONE SODIUM PHOSPHATE 6 MG: 4 INJECTION, SOLUTION INTRAMUSCULAR; INTRAVENOUS at 08:09

## 2018-04-03 RX ADMIN — TRAZODONE HYDROCHLORIDE 50 MG: 50 TABLET, FILM COATED ORAL at 22:22

## 2018-04-03 RX ADMIN — TRANEXAMIC ACID 1600 MG: 100 INJECTION, SOLUTION INTRAVENOUS at 09:24

## 2018-04-03 RX ADMIN — PROPOFOL 50 MG: 10 INJECTION, EMULSION INTRAVENOUS at 08:13

## 2018-04-03 RX ADMIN — TRAMADOL HYDROCHLORIDE 50 MG: 50 TABLET, COATED ORAL at 13:38

## 2018-04-03 RX ADMIN — POTASSIUM CHLORIDE, DEXTROSE MONOHYDRATE AND SODIUM CHLORIDE: 150; 5; 900 INJECTION, SOLUTION INTRAVENOUS at 11:10

## 2018-04-03 RX ADMIN — SENNOSIDES AND DOCUSATE SODIUM 1 TABLET: 8.6; 5 TABLET ORAL at 20:54

## 2018-04-03 RX ADMIN — PROPOFOL 30 MG: 10 INJECTION, EMULSION INTRAVENOUS at 09:13

## 2018-04-03 ASSESSMENT — ENCOUNTER SYMPTOMS
MUSCULOSKELETAL NEGATIVE: 1
NEUROLOGICAL NEGATIVE: 1
PALPITATIONS: 0
HEMOPTYSIS: 0
PSYCHIATRIC NEGATIVE: 1
VOMITING: 0
DYSRHYTHMIAS: 1
HEMATOCHEZIA: 0
DYSPNEA ON EXERTION: 0
PND: 0
HEMATEMESIS: 0
SHORTNESS OF BREATH: 0
NEAR-SYNCOPE: 0
CLAUDICATION: 0
CONSTITUTIONAL NEGATIVE: 1
ABDOMINAL PAIN: 0
ORTHOPNEA: 0
HEMATURIA: 0
SYNCOPE: 0
BLOATING: 0
COUGH: 0

## 2018-04-03 NOTE — PLAN OF CARE
Problem: Patient Care Overview  Goal: Plan of Care Review  Outcome: Ongoing (interventions implemented as appropriate)   04/03/18 0359   Coping/Psychosocial   Plan Of Care Reviewed With patient   Plan of Care Review   Progress progress toward functional goals as expected       Problem: Knee Arthroplasty (Total, Partial) (Adult)  Goal: Signs and Symptoms of Listed Potential Problems Will be Absent, Minimized or Managed (Knee Arthroplasty)  Outcome: Ongoing (interventions implemented as appropriate)      Problem: Pain, Acute (Adult)  Goal: Identify Related Risk Factors and Signs and Symptoms  Outcome: Ongoing (interventions implemented as appropriate)

## 2018-04-03 NOTE — H&P (VIEW-ONLY)
[facility] -  Pre-Operative Consultation       Patient Name: Gale Campos  Referring Surgeon: Bartolo Giordano    Reason for Referral: Pre-Operative Evaluation  Surgical Procedure: Left Total Knee Arthroplasty  Operative Date: 4/3/18  Other Providers:      PCP: Ariana Sue MD     Cardiology: Eric Van (seeing prior to surgery); she has previously followed with Dr. Russel Acevedo ([place])     HISTORY OF PRESENT ILLNESS      Gale Campos is a 85 y.o. female presenting today to the [facility] at [facility] for pre-operative evaluation prior to planned surgery.    The patient has a history of a prior right total knee replacement with Dr. Giordano several years ago.  She had no marked postoperative complications.  She reports now about 5 or so years of left-sided knee pain.  She is an aching pain centered around the knee without any marked radiation become increasingly severe in intensity, worse with activity, mildly relieved with rest, with no other symptoms.  She occasionally has swelling that then resolves on its own.    In regards to medical history:  · The patient has a history of hypertension and hyperlipidemia, medically managed.  · Patient has hypothyroidism medically managed.  · She has known sleep apnea with a prior positive sleep study but is not compliant secondary to mask intolerance.  · Patient has overactive bladder on Myrbetriq.  · Patient reports a prior cardiac workup elsewhere secondary to shortness of breath with exertion.  She had a nuclear stress test after having an exercise stress test.  The results are currently not in the system.  They are being requested by cardiology here.  She never warranted any further cardiac intervention.     The patient denies any current or recent chest pain or pressure, dyspnea, cough, sputum, fevers, chills, abdominal pain, nausea, vomiting, diarrhea or  other symptoms.   Functionally, the patient is able to ascend a flight or so of stairs, slowly, with no dyspnea or chest pain.     The patient denies, on specific questioning, the following:  No history of MI, arrhythmia,or CHF.  No history of DVT/PE.  No history of COPD.  No history of CVA.  No history of DM.   No history of CKD.     PAST MEDICAL AND SURGICAL HISTORY      Past Medical History:   Diagnosis Date   • Dyspnea on exertion    • H/O exercise stress test     unclear - followed with nuclear stress test   • History of nephrolithiasis    • History of nuclear stress test evaluation for SOB- no interventions   • Hypertension    • Hypothyroidism    • Lipid disorder    • Neck pain    • Nodule of left lung     seen CT scan -8/2017- instructed for follow up in 3 months- at follow up appt  size decreased - pt instructed to follow up later date    • OAB (overactive bladder)     leakage - mybetriq and setrace cream   • JENNIFFER (obstructive sleep apnea)     positive sleep study- 3-5 yrs ago- pt non-compliant with CPAP       Past Surgical History:   Procedure Laterality Date   • APPENDECTOMY     • EYE SURGERY      Cataract Surgery OU   • HEMORRHOID SURGERY     • HEMORRHOID SURGERY     • JOINT REPLACEMENT      right  knee -2010   • OOPHORECTOMY         MEDICATIONS        Current Outpatient Prescriptions:   •  biotin 1 mg tablet, Take 1,000 mcg by mouth 3 (three) times a day., Disp: , Rfl:   •  ginkgo biloba 40 mg tablet, Take by mouth., Disp: , Rfl:   •  iron, carbonyl 25 mg iron tablet, Take by mouth., Disp: , Rfl:   •  traZODone (DESYREL) 50 mg tablet, Take 50 mg by mouth nightly., Disp: , Rfl:   •  cholecalciferol, vitamin D3, (cholecalciferol) 1,000 unit tablet, Take 1,000 Units by mouth 2 (two) times a day., Disp: , Rfl:   •  estradiol (ESTRACE) 0.01 % (0.1 mg/gram) vaginal cream, Insert 2 g into the vagina 2 (two) times a day on Mondays, Wednesdays and Fridays., Disp: , Rfl:   •  hydrochlorothiazide (HYDRODIURIL) 12.5  "mg tablet, Take 12.5 mg by mouth daily., Disp: , Rfl:   •  levothyroxine sodium (TIROSINT) 100 mcg capsule, Take 100 mcg by mouth daily., Disp: , Rfl:   •  mirabegron (MYRBETRIQ) 50 mg ER tablet, Take 50 mg by mouth daily., Disp: , Rfl:   •  multivitamin tablet, Take by mouth daily., Disp: , Rfl:   •  rosuvastatin (CRESTOR) 10 mg tablet, Take 10 mg by mouth daily., Disp: , Rfl:     ALLERGIES      No known allergies    FAMILY HISTORY      family history includes Cancer in her brother and father; Hypertension in her mother.    Denies any prior known family history of DVTs/PEs/clotting disorder    SOCIAL HISTORY      Social History   Substance Use Topics   • Smoking status: Never Smoker   • Smokeless tobacco: Never Used   • Alcohol use Yes      Comment: rare     The patient resides in a single floor apartment not requiring stairs within the apartment nor to get into the apartment.    REVIEW OF SYSTEMS      All other systems reviewed and negative except as noted in HPI    PHYSICAL EXAMINATION      BP (!) 160/74 (BP Location: Left upper arm, Patient Position: Sitting)   Pulse 61   Temp (!) 35.8 °C (96.4 °F) (Oral)   Resp 18   Ht 1.651 m (5' 5\")   Wt 80.7 kg (178 lb)   BMI 29.62 kg/m²   Body mass index is 29.62 kg/m².  GEN: well-developed and well-nourished; not in acute distress  HEENT: normocephalic; atraumatic  NECK: no JVD; no bruits  CARDIO: regular rate and rhythm; no murmurs or rubs  RESP: clear to auscultation bilaterally; no rales, rhonchi, or wheezes  ABD: soft, non-distended, non-tender, normal bowel sounds  EXT: no cyanosis, clubbing, or edema  SKIN: clean, dry, warm, and intact  MUSCULOSKELETAL: no injury or deformity  NEURO: alert and oriented x 3; nonfocal  BEHAVIOR/EMOTIONAL: appropriate; cooperative    LABS / EKG        Labs  Lab Results   Component Value Date     03/16/2018    K 3.5 (L) 03/16/2018     03/16/2018    BUN 13 03/16/2018    CREATININE 0.6 03/16/2018    WBC 3.94 03/16/2018    " "HGB 13.3 03/16/2018    HCT 38.6 03/16/2018     03/16/2018       ECG/Telemetry  Sinus bradycardia; LAFB    ·  Information obtained through \"Care Everywhere\" from [facility] shows the following: \"8/15/2017  Coronary CT chest images and report reviewed, radiologist interpretation follows:  1. There is no adenopathy in the visualized portions of the mediastinum and hilar regions. Right middle lobe pulmonary nodule measures 23 x 16 mm.   2. There is some groundglass opacity in the periphery of the lingula. There is linear atelectasis in the right middle lobe inferiorly. There is no filling defects within the pulmonary vessels to suggest pulmonary embolism.   3. Retrospective EKG gating was perfomed with IV contrast. Multiplanar reformatted images of the coronary artery was performed. The right coronary artery arises from the right sinus of valsalva. The left main coronary artery arises from the left sinus of valsalva.   4 The right coronary artery supplies the posterior descending coronary artery and is dominant.   5. Right coronary artery - there is no calcified or non calcified plaque.  6. Left main coronary artery - there is no calcified or non calcified plaque.  7. Left anterior descending coronary artery - there is no calcified or non calcified plaque  8. Left circumflex coronary artery - there is no calcified or non calcified plaque\"    ASSESSMENT AND PLAN         Preop examination  Medical management and [place]-operative risk commentary noted as per this document's contents.      Osteoarthritis of left knee  Surgery as scheduled.    Essential hypertension  Resume hydrochlorothiazide as long as no renal dysfunction, hypotension, nor volume depletion.  Postoperatively I would ensure she is not orthostatic.  I will  to use fall precautions.    Hypothyroidism  Continue levothyroxine.    Lipid disorder  Continue Crestor.    OAB (overactive bladder)  Continue Myrbetriq and prescribed cream.    JENNIFFER (obstructive " sleep apnea)  I note the patient has JENNIFFER.  She is intolerant of her mask.  I would recommend use of our JENNIFFER protocol as JENNIFFER places the patient at relatively increased risk (compared to a population without this diagnosis) of oxygen desaturation, cardiac events, acute respiratory failure, or an ICU transfer.     Abnormal EKG  Per cardiology.    Bifascicular block  Per cardiology.    Nodule of left lung  This is followed up in the [place] lung nodule program there.  This is noted for the record.    Outpatient follow-up as previously planned.         In regards to perioperative cardiac risk:  Defer to Dr. Van's commentary here.     Further comments:  Resume supplements when OK with surgical team.  I would encourage incentive spirometry to assist with minimizing [place]-operative pulmonary risk.  DVT prophylaxis and timing of such per the discretion of the surgeon.     Please do not hesitate to contact [facility] during the upcoming hospitalization with any questions or concerns.     Zachary Michele MD  3/17/2018

## 2018-04-03 NOTE — ASSESSMENT & PLAN NOTE
History of chronic hypertension controlled with outpatient HCTZ.  Perioperatively her HCTZ was held per protocol.  Postop hypertension in the absence of her antihypertensive and significant left knee pain, with latter being the predominant trigger of severe hypertension due to adrenaline surge..  Charted BP as high as 195/81 that improved after Toradol and other pain control..  No associated complaints.  Follow-up blood pressures much better.  -Continue usual outpatient HCTZ upon discharge.  -Certainly for SBP >180, could use hydralazine 10 mg IV as needed every 4-6 hours as needed  -Pain control per surgical team.  -Reviewed at length with patient, daughter at the bedside, and bedside nurse.  -Follow-up with PCP and primary outpatient cardiologist, Dr. Acevedo.  -Okay for discharge from cardiovascular standpoint

## 2018-04-03 NOTE — ANESTHESIA PROCEDURE NOTES
Spinal Block    Patient location during procedure: OR  Start time: 4/3/2018 8:05 AM  End time: 4/3/2018 8:12 AM  Reason for block: at surgeon's request  Staffing  Anesthesiologist: MARYLOU SHELTON  Performed: anesthesiologist   Preanesthetic Checklist  Completed: patient identified, site marked, surgical consent, pre-op evaluation, timeout performed, IV checked, risks and benefits discussed and monitors and equipment checked  Spinal Block  Patient position: sitting  Prep: ChloraPrep  Patient monitoring: heart rate, continuous pulse ox, blood pressure and cardiac monitor  Approach: midline  Location: L3-4  Injection technique: single-shot  Needle  Needle type: pencil-tip   Needle gauge: 25 G  Needle length: 3.5 in  Assessment  Sensory level: T4

## 2018-04-03 NOTE — PLAN OF CARE
Problem: Patient Care Overview  Goal: Plan of Care Review  Outcome: Ongoing (interventions implemented as appropriate)   04/03/18 2290   Coping/Psychosocial   Plan Of Care Reviewed With patient   Plan of Care Review   Progress progress toward functional goals as expected   Outcome Summary Anticipate steady progress for D/C home, good participation from pt.       Problem: Knee Arthroplasty (Total, Partial) (Adult)  Goal: Signs and Symptoms of Listed Potential Problems Will be Absent, Minimized or Managed (Knee Arthroplasty)  Outcome: Ongoing (interventions implemented as appropriate)      Problem: Acute Therapy Services Goal & Intervention Plan  Goal: Bed Mobility Goal  Outcome: Ongoing (interventions implemented as appropriate)    Goal: Gait Training Goal  Outcome: Ongoing (interventions implemented as appropriate)    Goal: Stairs Goal  Outcome: Ongoing (interventions implemented as appropriate)    Goal: Transfer Training Goal  Outcome: Ongoing (interventions implemented as appropriate)

## 2018-04-03 NOTE — ANESTHESIA PREPROCEDURE EVALUATION
Anesthesia ROS/MED HX    Anesthesia History - neg  Pulmonary    Sleep apnea  Neuro/Psych - neg  Cardiovascular   hypertension  GI/Hepatic- neg  Musculoskeletal- neg  Endo/Other   Hypothyroidism   Chronic renal disease (nephrolithiasis)      Relevant Problems   No active problems are marked relevant to this note.       Physical Exam    Airway   Mallampati: II   TM distance: >3 FB   Neck ROM: full  Cardiovascular - normal   Rhythm: regular   Rate: normal  Pulmonary - normal   clear to auscultation  Dental - normal        Anesthesia Plan    Plan: spinal    Technique: spinal     Lines and Monitors: PIV   ASA 3  Anesthetic plan and risks discussed with: patient  Postop Plan:   Pain Management: IV analgesics

## 2018-04-03 NOTE — CONSULTS
"     Cardiology Inpatient Consultation       HPI    Gale Campos 85 y.o. female being consulted for postop CV care following left TKA 4/3/18. \"jaimee\" has a history of hypertension, hyperlipidemia, and noncritical coronary artery disease with coronary CT angiogram showing nonobstructive CAD.    I met with her daughter and daughter-in-law at bedside.  Post-op HTN with peak /81. She held her hctz today as instructed. C/o left knee pain and just received pain medications.  She has not urinated or had a bowel movement today.  She denies chest pain, dyspnea, palpitations, lightheadedness, headache or bleeding complaints.  She is getting some pain relief now.          Past Medical History:   Diagnosis Date   • Dyspnea on exertion    • H/O exercise stress test     unclear - followed with nuclear stress test   • History of nephrolithiasis    • History of nuclear stress test evaluation for SOB- no interventions   • Hypertension    • Hypothyroidism    • Lipid disorder    • Neck pain    • Nodule of left lung     seen CT scan -8/2017- instructed for follow up in 3 months- at follow up appt  size decreased - pt instructed to follow up later date    • OAB (overactive bladder)     leakage - mybetriq and setrace cream   • JENNIFFER (obstructive sleep apnea)     positive sleep study- 3-5 yrs ago- pt non-compliant with CPAP     Past Surgical History:   Procedure Laterality Date   • APPENDECTOMY     • EYE SURGERY      Cataract Surgery OU   • HEMORRHOID SURGERY     • HEMORRHOID SURGERY     • JOINT REPLACEMENT      right  knee -2010   • OOPHORECTOMY         Medications:    acetaminophen 650 mg oral q6h INT   aspirin 81 mg oral BID   ceFAZolin 2 g intravenous q8h INT   cholecalciferol (vitamin D3) 1,000 Units oral BID   [START ON 4/4/2018] dexamethasone 6 mg intravenous Once   [START ON 4/4/2018] hydrochlorothiazide 12.5 mg oral Daily   ketorolac 7.5 mg intravenous q6h WAYLON   levothyroxine sodium 100 mcg oral Daily   multivitamin 1 " tablet oral q PM   NON FORMULARY REQUEST 50 mg oral Daily   ondansetron ODT 4 mg oral Daily (6a)   [START ON 2018] pantoprazole 40 mg oral Daily before breakfast   polyethylene glycol 17 g oral Daily   rosuvastatin 10 mg oral Daily (6p)   sennosides-docusate sodium 1 tablet oral BID   traZODone 50 mg oral Nightly       Allergies:   No known allergies    SOCIAL AND FAMILY HISTORY:     She is .  She lives in an apartment.  She has 2 sons and a daughter.  She retired.  She did office work.  She never smoked.  She regularly exercises and lifts weights.  She has a living will.  Her mother  of old age her father  of throat cancer brother  of cancer but she is not sure the type.  One sister is alive without heart disease.        Review of Systems   Constitution: Negative.   HENT: Negative for nosebleeds.    Cardiovascular: Negative for chest pain, claudication, dyspnea on exertion, leg swelling, near-syncope, orthopnea, palpitations, paroxysmal nocturnal dyspnea and syncope.   Respiratory: Negative for cough, hemoptysis and shortness of breath.    Skin: Negative.    Musculoskeletal: Negative.  Negative for gout.   Gastrointestinal: Negative for bloating, abdominal pain, hematemesis, hematochezia, melena and vomiting.   Genitourinary: Negative.  Negative for hematuria.        Has not urinated postop.    Neurological: Negative.    Psychiatric/Behavioral: Negative.         Objective    BP (!) 181/79 (BP Location: Right upper arm, Patient Position: Lying)   Pulse (!) 52   Temp (!) 35.9 °C (96.6 °F) (Oral)   Resp 18   SpO2 99%    I get 138/66 right arm sitting     Intake/Output Summary (Last 24 hours) at 18 1430  Last data filed at 18 1110   Gross per 24 hour   Intake             1125 ml   Output               50 ml   Net             1075 ml     Wt Readings from Last 3 Encounters:   18 80.7 kg (178 lb)   18 80.7 kg (178 lb)   ]    Physical Exam   Constitutional: She is oriented  "to person, place, and time. She appears well-developed and well-nourished.   HENT:   Head: Normocephalic and atraumatic.   Nose: Nose normal.   Mouth/Throat: Oropharynx is clear and moist.   Eyes: EOM are normal. Pupils are equal, round, and reactive to light.   Neck: No JVD present. Carotid bruit is not present. No thyromegaly present.   Cardiovascular: Normal rate and regular rhythm.  Exam reveals no gallop and no friction rub.    Murmur (very low grade systolic murmur at rsb.) heard.  Pulmonary/Chest: Breath sounds normal. No tachypnea. No respiratory distress. She has no wheezes. She has no rhonchi. She has no rales.   Abdominal: Soft. Bowel sounds are normal. She exhibits no distension and no mass. There is no tenderness.   Musculoskeletal: She exhibits no edema.   B/l scds.    Lymphadenopathy:     She has no cervical adenopathy.   Neurological: She is alert and oriented to person, place, and time. No cranial nerve deficit.   Skin: Skin is warm and dry.   Well perfused.   Psychiatric: She has a normal mood and affect. Judgment normal.                 Labs-  CMP Results       03/16/18                          1427                      K 3.5 (L)           Cl 102           CO2 31           Glucose 91           BUN 13           Creatinine 0.6           Calcium 9.6           Anion Gap 8           EGFR &gt;60.0                         CBC Results       03/16/18                          1427           WBC 3.94           RBC 4.37           HGB 13.3           HCT 38.6           MCV 88.3           MCH 30.4           MCHC 34.5                                    PT PTT Results     No lab values to display.                Imaging   Coronary CT angiogram 8/15/17:  ·  Information obtained through \"Care Everywhere\" from [facility] shows the following: \"8/15/2017  Coronary CT chest images and report reviewed, radiologist interpretation follows:  1. There is no adenopathy in the visualized portions of the mediastinum and " "hilar regions. Right middle lobe pulmonary nodule measures 23 x 16 mm.   2. There is some groundglass opacity in the periphery of the lingula. There is linear atelectasis in the right middle lobe inferiorly. There is no filling defects within the pulmonary vessels to suggest pulmonary embolism.   3. Retrospective EKG gating was perfomed with IV contrast. Multiplanar reformatted images of the coronary artery was performed. The right coronary artery arises from the right sinus of valsalva. The left main coronary artery arises from the left sinus of valsalva.   4 The right coronary artery supplies the posterior descending coronary artery and is dominant.   5. Right coronary artery - there is no calcified or non calcified plaque.  6. Left main coronary artery - there is no calcified or non calcified plaque.  7. Left anterior descending coronary artery - there is no calcified or non calcified plaque  8. Left circumflex coronary artery - there is no calcified or non calcified plaque\"      Stress Cardiolite 7/14/17: Performed at [place].  Russel protocol 5 minutes, 85% target heart rate.  7 metastases.  GI uptake made inferior wall defect difficult and one could not exclude ischemia which led to the coronary CT angiogram.  EF 69%.       ECG   Sinus bradycardia, left axis deviation with an LAFB/RBBB pattern.  Voltage criteria for LVH.          Essential hypertension   Assessment & Plan    History of chronic hypertension controlled with outpatient HCTZ.  Operatively HCTZ was held per protocol.  Postop hypertension in the absence of her antihypertensive and significant left knee pain.  Charted BP as high as 195/81.  No associated complaints.  Rechecked a manual BP which is 164/82.  -Start HCTZ 25 mg daily now (takes 12.5 mg daily at home).  -Overnight for SBP >180, use hydralazine 10 mg IV as needed every 2-4 hours.  -Control per surgical team.  -Indicated recommendations with Jonelle Peñaloza PA-C.        OA (osteoarthritis) " of knee   Assessment & Plan    His post left total knee thyroplasty April 3, 2018.  Management per orthopedic team.        JENNIFFER (obstructive sleep apnea)   Assessment & Plan    Is intolerant to CPAP.        Lipid disorder   Assessment & Plan    On outpatient rosuvastatin therapy.        Bifascicular block   Assessment & Plan    Fascicular block with LAFB/RBBB.    Telemetry monitoring                   Please call with additional questions.  Thank you.     DARRICK Kulkarni   4/3/2018  2:30 PM     I have seen and examined the patient.  I have reviewed the fellow note and supporting documentation.  The note has been amended as appropriate    Agree with recommendations as outlined above.    Heber Mallory MD   4/3/2018

## 2018-04-03 NOTE — OR SURGEON
Pre-Procedure patient identification:  I am the primary operating surgeon/proceduralist and I have identified the patient on 04/03/18 at 7:18 AM Bartolo Giordano MD  Phone Number: [phone]

## 2018-04-03 NOTE — ANESTHESIA POSTPROCEDURE EVALUATION
Patient: Gale Campos    Procedure Summary     Date:  04/03/18 Room / Location:  [facility] OR [facility] / [facility] OR    Anesthesia Start:  0756 Anesthesia Stop:  1015    Procedure:  Left Total Knee Arthroplasty ( David Arya ) (Left ) Diagnosis:  (Left Knee Pain)    Surgeon:  Bartolo Giordano MD Responsible Provider:  Heber Castro MD    Anesthesia Type:  spinal ASA Status:  3          Anesthesia Type: spinal  PACU Vitals  4/3/2018 1001 - 4/3/2018 1101      4/3/2018 1010 4/3/2018 1025 4/3/2018 1030 4/3/2018 1035    BP: 135/68 - 127/66 -    Temp: 36.3 °C (97.4 °F) - - -    Pulse: (!)  54 (!)  48 (!)  46 (!)  46    Resp: 19 19 14 15    SpO2: 98 % 99 % 99 % 100 %              4/3/2018 1040 4/3/2018 1045 4/3/2018 1050 4/3/2018 1055    BP: - 140/69 - -    Temp: - - - -    Pulse: (!)  46 (!)  46 (!)  46 (!)  48    Resp: 19 16 13 15    SpO2: 99 % 99 % 100 % 99 %              4/3/2018 1100             BP: (!)  154/72       Temp: -       Pulse: (!)  48       Resp: 15       SpO2: 100 %               Anesthesia Post Evaluation    Pain management: adequate  Patient location during evaluation: PACU  Patient participation: complete - patient participated  Level of consciousness: awake and alert  Cardiovascular status: acceptable  Respiratory status: acceptable  Hydration status: acceptable  Anesthetic complications: no

## 2018-04-03 NOTE — BRIEF OP NOTE
Left Total Knee Arthroplasty ( Brett Arya ) (L) Procedure Note    Procedure:    Left Total Knee Arthroplasty ( Brett Arya )  CPT(R) Code:  53177 - NV TOTAL KNEE ARTHROPLASTY    Pre-operative Diagnosis: left knee primary osteoarthritis    Post-operative Diagnosis: left knee primary osteoarthritis    Surgeon(s) and Role:     * Bartolo Giordano MD - Primary     * James Dejesus PGY-5 - Assistant    Anesthesia: Spinal    Staff:   Circulator: Alena Tomas RN; Aleja Douglas RN  Physician Assistant: DARRICK Kumar  Scrub Person: Isabelle Stein RN    Procedure Details   See operative report    Estimated Blood Loss: 10cc    Specimens:                No specimens collected during this procedure.      Drains:  None    Implants:   Implant Name Type Inv. Item Serial No.  Lot No. LRB No. Used   CEMENT BONE PALACOS RADIOPAQUE - [id number] - [id number]  CEMENT BONE PALACOS RADIOPAQUE [id number] [employer]  Left 1   CEMENT BONE PALACOS RADIOPAQUE - [id number] - [id number]  CEMENT BONE PALACOS RADIOPAQUE [id number] [employer]  Left 1   SCREWS FIXATION HEADED (BRE[employer]) - [id number] Bone screw SCREWS FIXATION HEADED (BRETT)  [employer]  Left 1   PLUG STEM NEXGEN TAPER - [id number] - [id number] Bone screw PLUG STEM NEXGEN TAPER [id number] [employer]  Left 1   SCREW NEXGEN COMPLETE KNEE STEM EXTENSION REPLACEMENT - [id number] - [id number] Bone screw SCREW NEXGEN COMPLETE KNEE STEM EXTENSION REPLACEMENT +[id number] [employer]  Left 1   COMPONENT FEMORAL LPS FLEX GSF SZ E-L - [id number] - [id number] Femoral knee component COMPONENT FEMORAL LPS FLEX GSF SZ E-L [id number] [employer] [id number] Left 1   STEM TIBIAL COMPONENT NEXGEN #3 - [id number] - [id number] Tibial insert STEM TIBIAL COMPONENT NEXGEN #3 [id number] [employer] [id number] Left 1    PATELLA COMPONENT 32MM - [id number] - [id number] Patellar implant PATELLA COMPONENT 32MM [id number] [employer] [id number] Left 1   SURFACE ARTICULAR NEXGEN FLEX EF 10MM - [id number] - [id number] Tibial insert SURFACE ARTICULAR NEXGEN FLEX EF 10MM [id number] [employer] [id number] Left 1              Complications:  None; patient tolerated the procedure well.           Disposition: PACU - hemodynamically stable.           Condition: stable    Bartolo Giordano MD  Phone Number: [phone]

## 2018-04-03 NOTE — PROGRESS NOTES
Patient: Gale Campos  Location: [place] [address]  MRN: [id number]  Today's date: 4/3/2018    Pt left in chair, call bell and personal items in reach, chair alarm on. Incontinence pad and draw sheet under pt. Nursing notified.          Pain/Vitals     Row Name 04/03/18 1445 04/03/18 1516       Pain/Comfort/Sleep    Presence of Pain complains of pain/discomfort  --    Pain Body Location - Side Left  --    Pain Body Location knee  --    Pain Rating (0-10): Rest 4  --       Vital Signs    Heart Rate (from SpO2) 60 beats per minute 58 beats per minute    SpO2 98 % 95 %    BP (!)  165/76 (!)  182/85          Prior Living Environment  Lives With: alone  Living Arrangements: apartment (elevator to enter) Equipment Currently Used at Home: grab bar (owns RW, shower stool (no back))       Prior Level of Function  Ambulation: independent  Transferring: independent  Toileting: independent  Bathing: independent  Dressing: independent  Eating: independent  Communication: understands/communicates without difficulty  Swallowing: swallows foods/liquids without difficulty  Equipment Currently Used at Home: grab bar (owns RW, shower stool (no back))           PT Evaluation - 04/03/18 1500        Session Details    Document Type initial evaluation    Mode of Treatment physical therapy       Time Calculation    Start Time 1445    Stop Time 1516    Time Calculation (min) 31 min       General Information    Patient Profile Reviewed? yes       Orientation Log    [facility] 3-->spontaneous/free recall    [facility] 3-->spontaneous/free recall    Name of Hospital 3-->spontaneous/free recall    Month 3-->spontaneous/free recall    Date 3-->spontaneous/free recall    Year 3-->spontaneous/free recall    Day of Week 3-->spontaneous/free recall    Clock Time 3-->spontaneous/free recall    Etiology/Event 3-->spontaneous/free recall    Pathology Deficits 3-->spontaneous/free recall    Total Score 30        Cognition/Psychosocial    Safety Awareness intact       Sensory    Sensory General Assessment no sensation deficits identified       Range of Motion (ROM)    General Range of Motion no range of motion deficits identified       Manual Muscle Testing (MMT)    General MMT Assessment no strength deficits identified       Bed Mobility/Transfers    Extremity Weight Bearing Status left lower extremity    Left LE Weight Bearing Status weight bearing as tolerated       Bed Chair WC Transfer Training    Bed Mobility Assessment/Interventions supine to sit    Sit-Stand Transfers, [place] minimum assist (75% patient effort);1 person assist    Stand-Sit Transfers, [place] minimum assist (75% patient effort);1 person assist    Supine to Sit, [place] minimum assist (75% patient effort);1 person assist       Gait Training    [place] minimum assist (75% or more patient effort)    Assistive Device walker, front-wheeled    Distance in Feet 60 feet    Gait Deviations Identified antalgic;decreased gait speed;decreased step length       PT Clinical Impression    Patient's Goals For Discharge return home;return to all previous roles/activities    Plan For Care Reviewed: Physical Therapy PT plan for care discussed with patient;patient voices agreement with PT plan for care    Impairments Found (PT Eval) gait, locomotion, and balance    Rehab Potential/Prognosis good, to achieve stated therapy goals    Frequency of Treatment 5-7 times per week    Problem List decreased strength;impaired balance;pain    Anticipated Equipment Needs at Discharge shower chair    Expected Discharge Disposition home    Daily Outcome Statement Pt presents w/ min deficits from fxnl mobility baseline, anticipate good progress for home.                   Education provided this session. See the Patient Education summary report for full details.    PT Care Plan Goals    Flowsheet Row Most Recent Value   Stair Goal, PT   PT STG: Stairs  modified  independence   STG Number of Stairs  12   PT STG Duration: Stairs  3 days or less   PT STG Outcome: Stairs  goal ongoing      PT Care Plan Goals    Flowsheet Row Most Recent Value   Bed Mobility Goal   Date Goal Established: Bed Mobility  04/03/18   Time to Achieve Goal: Bed Mobility  by discharge   Goal Activity: Bed Mobility  all bed mobility activities   Goal Level of Grahn: Bed Mobility  independent   Goal Outcome Achieved: Bed Mobility  goal ongoing   Gait Training Goal   Date Goal Established: Gait Training  04/03/18   Time to Achieve Goal: Gait Training  by discharge   Level of Grahn Goal: Gait Training  modified independence   Assistive Device Used: Gait Training  walker, rolling   Distance Goal: Gait Training (feet)  250 feet   Goal Outcome Achieved: Gait Training  goal ongoing   Goal Transfer Training   Date Goal Established: Transfer Training  04/03/18   Time to Achieve Goal: Transfer Training  by discharge   Goal Activity: Transfer Training  sit-to-stand/stand-to-sit   Transfer Training Goal, [place] Level  independent   Goal Outcome Achieved: Transfer Training  goal ongoing

## 2018-04-04 ENCOUNTER — APPOINTMENT (OUTPATIENT)
Dept: PHYSICAL THERAPY | Facility: HOSPITAL | Age: 83
End: 2018-04-04
Payer: MEDICARE

## 2018-04-04 VITALS
HEART RATE: 59 BPM | SYSTOLIC BLOOD PRESSURE: 118 MMHG | RESPIRATION RATE: 18 BRPM | TEMPERATURE: 95.6 F | OXYGEN SATURATION: 96 % | DIASTOLIC BLOOD PRESSURE: 64 MMHG

## 2018-04-04 PROBLEM — R06.09 DOE (DYSPNEA ON EXERTION): Status: ACTIVE | Noted: 2018-04-04

## 2018-04-04 LAB
ANION GAP SERPL CALC-SCNC: 6 MEQ/L (ref 3–15)
BUN SERPL-MCNC: 7 MG/DL (ref 8–20)
CALCIUM SERPL-MCNC: 8.1 MG/DL (ref 8.9–10.3)
CHLORIDE SERPL-SCNC: 105 MMOL/L (ref 98–109)
CO2 SERPL-SCNC: 26 MMOL/L (ref 22–32)
CREAT SERPL-MCNC: 0.6 MG/DL (ref 0.6–1.1)
ERYTHROCYTE [DISTWIDTH] IN BLOOD BY AUTOMATED COUNT: 14.3 % (ref 11.7–14.4)
GFR SERPL CREATININE-BSD FRML MDRD: >60 ML/MIN/1.73M*2
GLUCOSE SERPL-MCNC: 138 MG/DL (ref 70–99)
HCT VFR BLDCO AUTO: 30.1 % (ref 35–45)
HGB BLD-MCNC: 10.3 G/DL (ref 11.8–15.7)
MCH RBC QN AUTO: 30.5 PG (ref 28–33.2)
MCHC RBC AUTO-ENTMCNC: 34.2 G/DL (ref 32.2–35.5)
MCV RBC AUTO: 89.1 FL (ref 83–98)
PDW BLD AUTO: 9.4 FL (ref 9.4–12.3)
PLATELET # BLD AUTO: 164 K/UL (ref 150–369)
POTASSIUM SERPL-SCNC: 3.5 MMOL/L (ref 3.6–5.1)
RBC # BLD AUTO: 3.38 M/UL (ref 3.93–5.22)
SODIUM SERPL-SCNC: 137 MMOL/L (ref 136–144)
WBC # BLD AUTO: 8.32 K/UL (ref 3.8–10.5)

## 2018-04-04 PROCEDURE — 36415 COLL VENOUS BLD VENIPUNCTURE: CPT | Performed by: PHYSICIAN ASSISTANT

## 2018-04-04 PROCEDURE — 80048 BASIC METABOLIC PNL TOTAL CA: CPT | Performed by: PHYSICIAN ASSISTANT

## 2018-04-04 PROCEDURE — 63600000 HC DRUGS/DETAIL CODE: Performed by: PHYSICIAN ASSISTANT

## 2018-04-04 PROCEDURE — 97116 GAIT TRAINING THERAPY: CPT | Mod: GP

## 2018-04-04 PROCEDURE — 99232 SBSQ HOSP IP/OBS MODERATE 35: CPT | Performed by: INTERNAL MEDICINE

## 2018-04-04 PROCEDURE — 97530 THERAPEUTIC ACTIVITIES: CPT | Mod: GP

## 2018-04-04 PROCEDURE — 85027 COMPLETE CBC AUTOMATED: CPT | Performed by: PHYSICIAN ASSISTANT

## 2018-04-04 PROCEDURE — 63700000 HC SELF-ADMINISTRABLE DRUG: Performed by: PHYSICIAN ASSISTANT

## 2018-04-04 RX ORDER — NAPROXEN SODIUM 220 MG/1
81 TABLET, FILM COATED ORAL 2 TIMES DAILY
Qty: 178 TABLET | Refills: 0
Start: 2018-04-04 | End: 2018-05-04

## 2018-04-04 RX ORDER — POTASSIUM CHLORIDE 750 MG/1
40 TABLET, FILM COATED, EXTENDED RELEASE ORAL ONCE
Status: COMPLETED | OUTPATIENT
Start: 2018-04-04 | End: 2018-04-04

## 2018-04-04 RX ORDER — LEVOTHYROXINE SODIUM 100 UG/1
100 TABLET ORAL
Status: DISCONTINUED | OUTPATIENT
Start: 2018-04-04 | End: 2018-04-04 | Stop reason: HOSPADM

## 2018-04-04 RX ORDER — POLYETHYLENE GLYCOL 3350 17 G/17G
17 POWDER, FOR SOLUTION ORAL DAILY PRN
Start: 2018-04-04 | End: 2018-05-04

## 2018-04-04 RX ORDER — ACETAMINOPHEN 325 MG/1
650 TABLET ORAL EVERY 4 HOURS PRN
Start: 2018-04-04 | End: 2018-05-04

## 2018-04-04 RX ORDER — ESTRADIOL 0.1 MG/G
CREAM VAGINAL
Qty: 2 TUBE | Refills: 0
Start: 2018-04-04

## 2018-04-04 RX ORDER — TOLTERODINE 4 MG/1
4 CAPSULE, EXTENDED RELEASE ORAL DAILY
Status: DISCONTINUED | OUTPATIENT
Start: 2018-04-04 | End: 2018-04-04 | Stop reason: HOSPADM

## 2018-04-04 RX ORDER — AMOXICILLIN 250 MG
1 CAPSULE ORAL 2 TIMES DAILY PRN
Qty: 178 TABLET | Refills: 0
Start: 2018-04-04

## 2018-04-04 RX ORDER — TRAMADOL HYDROCHLORIDE 50 MG/1
50-100 TABLET ORAL EVERY 6 HOURS PRN
Qty: 30 TABLET | Refills: 0 | Status: SHIPPED | OUTPATIENT
Start: 2018-04-04 | End: 2018-04-09

## 2018-04-04 RX ORDER — OXYCODONE HYDROCHLORIDE 5 MG/1
5-10 TABLET ORAL EVERY 4 HOURS PRN
Qty: 30 TABLET | Refills: 0 | Status: SHIPPED | OUTPATIENT
Start: 2018-04-04 | End: 2018-04-09

## 2018-04-04 RX ORDER — POTASSIUM CHLORIDE 750 MG/1
40 TABLET, FILM COATED, EXTENDED RELEASE ORAL ONCE
Status: DISCONTINUED | OUTPATIENT
Start: 2018-04-04 | End: 2018-04-04

## 2018-04-04 RX ADMIN — ACETAMINOPHEN 650 MG: 325 TABLET ORAL at 00:05

## 2018-04-04 RX ADMIN — VITAMIN D, TAB 1000IU (100/BT) 1000 UNITS: 25 TAB at 08:34

## 2018-04-04 RX ADMIN — ASPIRIN 81 MG: 81 TABLET, CHEWABLE ORAL at 08:33

## 2018-04-04 RX ADMIN — DEXAMETHASONE SODIUM PHOSPHATE 6 MG: 4 INJECTION INTRA-ARTICULAR; INTRALESIONAL; INTRAMUSCULAR; INTRAVENOUS; SOFT TISSUE at 00:06

## 2018-04-04 RX ADMIN — SENNOSIDES AND DOCUSATE SODIUM 1 TABLET: 8.6; 5 TABLET ORAL at 08:35

## 2018-04-04 RX ADMIN — PANTOPRAZOLE SODIUM 40 MG: 40 TABLET, DELAYED RELEASE ORAL at 07:35

## 2018-04-04 RX ADMIN — KETOROLAC TROMETHAMINE 7.5 MG: 30 INJECTION, SOLUTION INTRAMUSCULAR at 09:51

## 2018-04-04 RX ADMIN — ACETAMINOPHEN 650 MG: 325 TABLET ORAL at 11:56

## 2018-04-04 RX ADMIN — ACETAMINOPHEN 650 MG: 325 TABLET ORAL at 06:03

## 2018-04-04 RX ADMIN — TRAMADOL HYDROCHLORIDE 100 MG: 50 TABLET, COATED ORAL at 06:03

## 2018-04-04 RX ADMIN — POTASSIUM CHLORIDE 40 MEQ: 750 TABLET, FILM COATED, EXTENDED RELEASE ORAL at 11:56

## 2018-04-04 RX ADMIN — POLYETHYLENE GLYCOL 3350 17 G: 17 POWDER, FOR SOLUTION ORAL at 08:35

## 2018-04-04 RX ADMIN — Medication 2 G: at 00:07

## 2018-04-04 RX ADMIN — ONDANSETRON 4 MG: 4 TABLET, ORALLY DISINTEGRATING ORAL at 06:03

## 2018-04-04 RX ADMIN — TRAMADOL HYDROCHLORIDE 100 MG: 50 TABLET, COATED ORAL at 13:30

## 2018-04-04 NOTE — PLAN OF CARE
Problem: Pain, Acute (Adult)  Goal: Acceptable Pain Control/Comfort Level  Outcome: Ongoing (interventions implemented as appropriate)   04/04/18 0113   Pain, Acute (Adult)   Acceptable Pain Control/Comfort Level making progress toward outcome

## 2018-04-04 NOTE — PROGRESS NOTES
Patient: Gale Campos  Location: [place] [address]  MRN: [id number]  Today's date: 4/4/2018     Pt owns rolling walker.  Patient left sitting in chair, all stated needs met.  RN aware.            Pain/Vitals     Row Name 04/04/18 1006 04/04/18 1035       Pain/Comfort/Sleep    Presence of Pain complains of pain/discomfort  --    Preferred Pain Scale number (Numeric Rating Pain Scale)  --    Pain Body Location - Side Left  --    Pain Body Location - Orientation anterior  --    Pain Body Location knee  --    Pain Rating (0-10): Rest 4  --    Pain Rating (0-10): Activity 6  --       Vital Signs    Heart Rate (from SpO2) 54 beats per minute 59 beats per minute    SpO2 94 % 96 %    Patient Activity At rest Walking    Oxygen Therapy None (Room air) None (Room air)    BP (!)  122/57 118/64    MAP (mmHg) 82 mmHg  --    BP Location Right upper arm Right upper arm    BP Method Automatic Manual    Patient Position Sitting Sitting          Prior Living Environment  Lives With: alone  Living Arrangements: apartment Equipment Currently Used at Home: canetimothy       Prior Level of Function  Ambulation: assistive equipment  Transferring: assistive equipment  Toileting: independent  Bathing: independent  Dressing: independent  Eating: independent  Communication: understands/communicates without difficulty  Swallowing: swallows foods/liquids without difficulty  Equipment Currently Used at Home: canetimothy           PT Treatment Summary - 04/04/18 1006        Session Details    Document Type daily treatment    Mode of Treatment individual therapy;physical therapy       Time Calculation    Start Time 1006    Stop Time 1040    Time Calculation (min) 34 min       General Information    Patient Profile Reviewed? yes       Bed Chair  Transfer Training    Bed Mobility Assessment/Interventions rolling left;rolling right;supine to sit;sit to supine    Sit-Stand Transfers, [place] supervision     Stand-Sit Transfers, [place] supervision    Roll Left, [place] independent    Roll Right, [place] independent    Supine to Sit, [place] supervision    Sit to Supine, [place] supervision       Gait Training    [place] supervision    Assistive Device walker, 4-wheeled    Distance in Feet 123 feet    Gait Pattern Utilized step-to    Gait Deviations Identified antalgic;decreased randi;decreased heel strike;decreased step length;decreased stride length    Comment VCs to normalize gait pattern.       Stairs Training    [place] not tested       LE Seated Therapeutic Exercise    Exercise(s) Performed ankle dorsiflexion   heel slide, seated knee extension    Exercise Type AROM (active range of motion);static stretching    Expected Outcomes improve functional tolerance, self-care activity;improve functional tolerance, household activity;improve functional tolerance, community activity    Sets/Reps Detail 1x10    Ability to Transfer Skills beginning to transfer skills to functional activity       PT Clinical Impression    Plan For Care Reviewed: Physical Therapy patient voices agreement with PT plan for care;family voices agreement with PT plan for care    Functional Limitations in Following Categories (PT Eval) work;community/leisure    Rehab Potential/Prognosis good, to achieve stated therapy goals    Frequency of Treatment 5-7 times per week    Activity Limitations Related to Problem List functional mobility not performed adequately or safely for community activity    Anticipated Equipment Needs at Discharge front wheeled walker   Pt owns RW.    Expected Discharge Disposition home with caregiver    Daily Outcome Statement Pt with improved mobility today.  Ambulating in hallway  with RW and supervision                   Education provided this session. See the Patient Education summary report for full details.    PT Care Plan Goals    Flowsheet Row Most Recent Value   Stair Goal, PT   PT  STG: Stairs  modified independence   STG Number of Stairs  12   PT STG Duration: Stairs  3 days or less   PT STG Outcome: Stairs  goal ongoing      PT Care Plan Goals    Flowsheet Row Most Recent Value   Bed Mobility Goal   Date Goal Established: Bed Mobility  04/03/18   Time to Achieve Goal: Bed Mobility  by discharge   Goal Activity: Bed Mobility  all bed mobility activities   Goal Level of [place]: Bed Mobility  independent   Goal Outcome Achieved: Bed Mobility  goal ongoing   Bed Mobility Goal, Progress  Pt at supervision level with RW   Gait Training Goal   Date Goal Established: Gait Training  04/03/18   Time to Achieve Goal: Gait Training  by discharge   Level of [place] Goal: Gait Training  modified independence   Assistive Device Used: Gait Training  walker, rolling   Distance Goal: Gait Training (feet)  250 feet   Goal Outcome Achieved: Gait Training  goal ongoing   Gait Training Goal, Progress  Pt progressing toward goals.   Goal Transfer Training   Date Goal Established: Transfer Training  04/03/18   Time to Achieve Goal: Transfer Training  by discharge   Goal Activity: Transfer Training  sit-to-stand/stand-to-sit   Transfer Training Goal, [place] Level  independent   Goal Outcome Achieved: Transfer Training  goal ongoing   Transfer Training Goal, Progress  Pt at supervision level for transfers.

## 2018-04-04 NOTE — PLAN OF CARE
Problem: Fall Risk (Adult)  Goal: Identify Related Risk Factors and Signs and Symptoms  Outcome: Ongoing (interventions implemented as appropriate)   04/04/18 0116   Fall Risk   Related Risk Factors (Fall Risk) gait/mobility problems   Signs and Symptoms (Fall Risk) presence of risk factors

## 2018-04-04 NOTE — PROGRESS NOTES
Chart reviewed. Patient is POD 1 LTKR. Met with patient bedside to discuss potential d/c needs. Patient lives alone in an apartment that is accessible by elevator. She uses a straight cane. Her plan is for home, where she has employed a private 24-hour home health aide. Her daughter can provide transportation. She has had home nursing in the past but cannot recall from whom, and also has been on coumadin in the past post-surgery. Will continue to follow to assist with any skilled d/c needs. -- Unique Naidu RN CC [id number]

## 2018-04-04 NOTE — ASSESSMENT & PLAN NOTE
She has a bifascicular block on her ECG.  No signs of high-grade heart block.  Okay for discharge from the cardiovascular standpoint.  Follow-up with PCP and outpatient cardiologist at [place], Dr. Acevedo.

## 2018-04-04 NOTE — PLAN OF CARE
Problem: Knee Arthroplasty (Total, Partial) (Adult)  Goal: Signs and Symptoms of Listed Potential Problems Will be Absent, Minimized or Managed (Knee Arthroplasty)  Outcome: Ongoing (interventions implemented as appropriate)   04/04/18 0114   Knee Arthroplasty (Total, Partial)   Problems Assessed (Knee Arthroplasty) functional deficit   Problems Present (Knee Arthroplasty) functional deficit

## 2018-04-04 NOTE — PROGRESS NOTES
Patient: Gale Campos   MRN: [id number]   : 1932       Daily Progress Note  (LOS: 0 days) 1 Day Post-Op s/p Procedure(s):  Left Total Knee Arthroplasty ( David Arya )    Subjective     85 y.o. y/o female s/p Procedure(s):  Left Total Knee Arthroplasty ( David Arya ).     Patient is doing well. Pain is controlled. Denies chest pain, shortness of breath. Denies nausea or vomiting. Admits to flatus but no BM. Voiding without difficulties. Tolerating diet. OOB with PT today. Denies fever or chills.     Patient Active Problem List   Diagnosis   • Essential hypertension   • Preprocedural cardiovascular examination   • Abnormal EKG   • Bifascicular block   • Lipid disorder   • Hypothyroidism   • JENNIFFER (obstructive sleep apnea)   • OAB (overactive bladder)   • Nodule of left lung   • Preop examination   • Osteoarthritis of left knee   • OA (osteoarthritis) of knee   • JERONIMO (dyspnea on exertion)       Objective     LAST VITALS:    Vitals:    18 0600 18 1004 18 1006 18 1035   BP:  (!) 110/52 (!) 122/57 118/64   BP Location:   Right upper arm Right upper arm   Patient Position:   Sitting Sitting   Pulse:    (!) 59   Resp: (!) 26   18   Temp:    (!) 35.3 °C (95.6 °F)   TempSrc:    Oral   SpO2: 98%  94% 96%       Temp (24hrs), Av.7 °C (96.3 °F), Min:35.2 °C (95.4 °F), Max:36.6 °C (97.8 °F)        Intake/Output Summary (Last 24 hours) at 18 1143  Last data filed at 18 0649   Gross per 24 hour   Intake          2702.08 ml   Output             1550 ml   Net          1152.08 ml      No intake/output data recorded.    Allergies: No known allergies       PHYSICAL EXAM:    Incision: clean, dry and intact with expected postop edema and ecchymosis. Incision reinforced with dermabond and covered with ABD and ace wrap. + 5/5 DF/PF/EHL bilateral. +NVI with sensation intact. Distal Pulses: 2+ bilateral,Calves: soft, non tender bilateral      LABS:       Results from last 7 days  Lab Units  04/04/18  0713   WBC K/uL 8.32   HEMOGLOBIN g/dL 10.3*   HEMATOCRIT % 30.1*   PLATELETS K/uL 164   SODIUM mmol/L 137   POTASSIUM mmol/L 3.5*   CHLORIDE mmol/L 105   CO2 mmol/L 26   BUN mg/dL 7*   CREATININE mg/dL 0.6   GLUCOSE mg/dL 138*   CALCIUM mg/dL 8.1*         Assessment/Plan     85 y.o. y/o female s/p Procedure(s):  Left Total Knee Arthroplasty ( David Arya )     1 Day Post-Op     1. DVT prophylaxis: Asprin 81 mg po BID for 4 weeks and bilateral SCD  2. PT/OT per protocol  3. Incentive spirometer  4. Pain management: Scheduled: tylenol, toradol PRN: oxycodone, tramadol   5. Bowel regimen: senokot, colace, miralax  6. Elevated BP yesterday: Resolved today. HCTZ re-started yesterday. BP in good control today. Continue current regimen.; Cardiology: appreciate recommendations   7. Hypokalemia: Kdur 40 meQ today. Prob. Due to HCTZ. Rec.for pt to f/u with PCP to inquire if additional supplementation is needed.     Dispo: home d/c today    DARRICK Matt  4/4/2018  11:43 AM           .

## 2018-04-04 NOTE — DISCHARGE SUMMARY
Ortho Discharge Summary    Admitting Provider: Bartolo Giordano MD  Discharge Provider: Bartolo Giordano MD  Primary Care Physician at Discharge: Ariana Sue [phone]     Admission Date: 4/3/2018     Discharge Date: 4/4/2018    Primary Discharge Diagnosis  DJD Left knee    Discharge Disposition  Skilled Nursing Facility-[facility]  Code Status at Discharge: Full Code    Discharge Medications     Medication List      START taking these medications    acetaminophen 325 mg tablet  Commonly known as:  TYLENOL  Take 2 tablets (650 mg total) by mouth every 4 (four) hours as needed for mild pain, headaches or fever.     aspirin 81 mg chewable tablet  Take 1 tablet (81 mg total) by mouth 2 (two) times a day. Obtain over the counter     oxyCODONE 5 mg immediate release tablet  Commonly known as:  ROXICODONE  Take 1-2 tablets (5-10 mg total) by mouth every 4 (four) hours as needed for severe pain for up to 5 days.     polyethylene glycol 17 gram packet  Commonly known as:  MIRALAX  Take 17 g by mouth daily as needed (constipation).     sennosides-docusate sodium 8.6-50 mg  Commonly known as:  SENOKOT-S  Take 1 tablet by mouth 2 (two) times a day as needed for constipation for up to 178 doses.     traMADol 50 mg tablet  Commonly known as:  ULTRAM  Take 1-2 tablets ( mg total) by mouth every 6 (six) hours as needed for moderate pain for up to 5 days.        CHANGE how you take these medications    estradiol 0.01 % (0.1 mg/gram) vaginal cream  Commonly known as:  ESTRACE  Apply twice a week as prescribed  What changed:  · how much to take  · how to take this  · when to take this  · additional instructions           Where to Get Your Medications      These medications were sent to [id number] - [place]TA[place]ER - [address]  [address], [place] [address]    Phone:  [phone]   · oxyCODONE 5 mg immediate release tablet  · traMADol  50 mg tablet     Information about where to get these medications is not yet available    Ask your nurse or doctor about these medications  · acetaminophen 325 mg tablet  · aspirin 81 mg chewable tablet  · estradiol 0.01 % (0.1 mg/gram) vaginal cream  · polyethylene glycol 17 gram packet  · sennosides-docusate sodium 8.6-50 mg         Active Issues Requiring Follow-up    Follow-up Appointments Arranged: Yes     Outpatient Follow-Up  No future appointments.    Referrals and Follow-ups to Schedule     Lifting Restrictions (Specify)       Maximum Weigth to Lift:  10 Pounds        Test Results Pending at Discharge      DETAILS OF HOSPITAL STAY    Presenting Problem/History of Present Illness  OA (osteoarthritis) of knee [M17.10]      Hospital Course  DISCHARGE SUMMARY    The patient is a 85 y.o. female who presented with a history of severe knee pain.  Dr. Dr. Giordano recommended Procedure(s):  Left Total Knee Arthroplasty ( David Arya ). The patient underwent the procedure on 4/3/2018 and tolerated the procedure well. Details of the procedure can be found in the operative summary. The patient’s post-operative course was remarkable for elevated blood pressure postop day of surgery which was treated and resolved on POD #1. The patient was seen postoperatively by Cardiolgy, PT, OT and social work and progressed to the point that on the day of discharge was tolerating a house diet, voiding without difficulty and ambulating with assistance. At the time of discharge the patient’s vital signs were stable with a clean, dry and intact incision. The patient was discharged on Asprin 81 mg po BID for 4 weeks for DVT prophylaxis, continue pain medications, resume preoperative medications and to follow up with their surgeon. The patient was advised to call the office with any problems including drainage from the incision, redness around the incision, worsening pain and fever greater than 101 degrees F.           Operative  Procedures Performed  Procedure(s):  Left Total Knee Arthroplasty ( David Arya )  Consults: cardiology

## 2018-04-04 NOTE — PLAN OF CARE
Problem: Patient Care Overview  Goal: Plan of Care Review  Outcome: Ongoing (interventions implemented as appropriate)   04/04/18 0116   Coping/Psychosocial   Plan Of Care Reviewed With patient   Plan of Care Review   Progress progress toward functional goals as expected

## 2018-04-04 NOTE — ASSESSMENT & PLAN NOTE
This was worked up last summer with stress testing which apparently was abnormal which led to coronary CT angiogram, performed at the hospital emergency [place] [facility] mid August, but the patient daughter cannot recall having this performed exactly at that facility, but nonetheless through the [facility] epic feature, unable to see the results showing widely patent coronary arteries.  Again I reviewed this with the patient and daughter.

## 2018-04-04 NOTE — PROGRESS NOTES
Cardiology Inpatient  Progress Note       SUBJECTIVE   This is a 85 y.o. year-old female admitted on 4/3/2018 with OA (osteoarthritis) of knee [M17.10].    Interval History: Her blood pressure was quite elevated yesterday midday but after pain control, and came down to a more reasonable level and in fact slightly low.  Asymptomatic with regard to lack of dizziness, chest pain or shortness of breath.  Pain control improving.  Not sure if she is ready to go home and daughter asking questions regarding safety.  Been seen by multidisciplinary team mates throughout the morning.    ROS A 14-point review of system was performed and was negative, or as documented above.     OBJECTIVE      Vital signs in last 24 hours:  Temp:  [35.2 °C (95.4 °F)-36.6 °C (97.8 °F)] 36.3 °C (97.4 °F)  Heart Rate:  [46-68] 52  Resp:  [13-32] 26  BP: (117-195)/(56-88) 136/67  FiO2 (%) (Set):  [99 %] 99 %      Intake/Output Summary (Last 24 hours) at 04/04/18 0919  Last data filed at 04/04/18 0649   Gross per 24 hour   Intake          3827.08 ml   Output             1600 ml   Net          2227.08 ml     Weights (last 7 days)     None          PHYSICAL EXAMINATION      Physical Exam   Constitutional: She is oriented to person, place, and time. She appears well-developed and well-nourished.   HENT:   Head: Normocephalic and atraumatic.   Nose: Nose normal.   Eyes: EOM are normal. Pupils are equal, round, and reactive to light. No scleral icterus.   Neck: No JVD present. No thyromegaly present.   Cardiovascular: Normal rate, regular rhythm and normal heart sounds.  Exam reveals no gallop and no friction rub.    No murmur heard.  Pulmonary/Chest: Breath sounds normal. She has no wheezes. She has no rales.   Abdominal: Soft. Bowel sounds are normal. She exhibits no distension and no mass. There is no tenderness. There is no rebound.   Musculoskeletal: She exhibits no edema or deformity.   Lymphadenopathy:     She has no cervical adenopathy.  "  Neurological: She is alert and oriented to person, place, and time. No cranial nerve deficit.   Skin: Skin is warm and dry. No rash noted. No erythema.   Psychiatric: She has a normal mood and affect. Her behavior is normal.      LABS / IMAGING / TELE/ MEDS      Labs    CBC Results       04/04/18 03/16/18                       0713 1427          WBC 8.32 3.94          RBC 3.38 (L) 4.37          HGB 10.3 (L) 13.3          HCT 30.1 (L) 38.6          MCV 89.1 88.3          MCH 30.5 30.4          MCHC 34.2 34.5           214                         CMP Results       04/04/18 03/16/18                       0713 1427           141          K 3.5 (L) 3.5 (L)          Cl 105 102          CO2 26 31          Glucose 138 (H) 91          BUN 7 (L) 13          Creatinine 0.6 0.6          Calcium 8.1 (L) 9.6          Anion Gap 6 8          EGFR &gt;60.0 &gt;60.0                           PT PTT Results     No lab values to display.          Troponin I Results     No lab values to display.          ECG/Telemetry  N/A with regard to telemetry.  Outpatient ECG reviewed.    Scheduled Meds:    acetaminophen 650 mg oral q6h INT   aspirin 81 mg oral BID   cholecalciferol (vitamin D3) 1,000 Units oral BID   hydrochlorothiazide 12.5 mg oral Daily   ketorolac 7.5 mg intravenous q6h WAYLON   levothyroxine sodium 100 mcg oral Daily   multivitamin 1 tablet oral q PM   NON FORMULARY REQUEST 50 mg oral Daily   ondansetron ODT 4 mg oral Daily (6a)   pantoprazole 40 mg oral Daily before breakfast   polyethylene glycol 17 g oral Daily   rosuvastatin 10 mg oral Daily (6p)   sennosides-docusate sodium 1 tablet oral BID   traZODone 50 mg oral Nightly         Imaging       Coronary CT angiogram 8/15/17:  ·  Information obtained through \"Care Everywhere\" from \[facility]\"" shows the following: \"8/15/2017  Coronary CT chest images and report reviewed, radiologist interpretation follows:  1. There is no adenopathy in the visualized portions of the " "mediastinum and hilar regions. Right middle lobe pulmonary nodule measures 23 x 16 mm.   2. There is some groundglass opacity in the periphery of the lingula. There is linear atelectasis in the right middle lobe inferiorly. There is no filling defects within the pulmonary vessels to suggest pulmonary embolism.   3. Retrospective EKG gating was perfomed with IV contrast. Multiplanar reformatted images of the coronary artery was performed. The right coronary artery arises from the right sinus of valsalva. The left main coronary artery arises from the left sinus of valsalva.   4 The right coronary artery supplies the posterior descending coronary artery and is dominant.   5. Right coronary artery - there is no calcified or non calcified plaque.  6. Left main coronary artery - there is no calcified or non calcified plaque.  7. Left anterior descending coronary artery - there is no calcified or non calcified plaque  8. Left circumflex coronary artery - there is no calcified or non calcified plaque\"      Stress Cardiolite 7/14/17: Performed at [place].  Russel protocol 5 minutes, 85% target heart rate.  7 metastases.  GI uptake made inferior wall defect difficult and one could not exclude ischemia which led to the coronary CT angiogram.  EF 69%.        ECG   Sinus bradycardia, left axis deviation with an LAFB/RBBB pattern.  Voltage criteria for LVH.             ASSESSMENT AND PLAN      Essential hypertension   Assessment & Plan    History of chronic hypertension controlled with outpatient HCTZ.  Perioperatively her HCTZ was held per protocol.  Postop hypertension in the absence of her antihypertensive and significant left knee pain, with latter being the predominant trigger of severe hypertension due to adrenaline surge..  Charted BP as high as 195/81 that improved after Toradol and other pain control..  No associated complaints.  Follow-up blood pressures much better.  -Continue usual outpatient HCTZ upon " discharge.  -Certainly for SBP >180, could use hydralazine 10 mg IV as needed every 4-6 hours as needed  -Pain control per surgical team.  -Reviewed at length with patient, daughter at the bedside, and bedside nurse.  -Follow-up with PCP and primary outpatient cardiologist, Dr. Acevedo.  -Okay for discharge from cardiovascular standpoint        JERONIMO (dyspnea on exertion)   Assessment & Plan    This was worked up last summer with stress testing which apparently was abnormal which led to coronary CT angiogram, performed at the hospital [facility] [place] [facility] mid August, but the patient daughter cannot recall having this performed exactly at that facility, but nonetheless through the [facility] epic feature, unable to see the results showing widely patent coronary arteries.  Again I reviewed this with the patient and daughter.        Bifascicular block   Assessment & Plan    Fascicular block with LAFB/RBBB.    Telemetry monitoring        Abnormal EKG   Assessment & Plan    She has a bifascicular block on her ECG.  No signs of high-grade heart block.  Okay for discharge from the cardiovascular standpoint.  Follow-up with PCP and outpatient cardiologist at [place], Dr. Acevedo.        Lipid disorder   Assessment & Plan    On outpatient rosuvastatin therapy.  Continue this upon discharge        OA (osteoarthritis) of knee   Assessment & Plan    His post left total knee thyroplasty April 3, 2018.  Management per orthopedic team.        JENNIFFER (obstructive sleep apnea)   Assessment & Plan    Is intolerant to CPAP.                    Eric Van,   4/4/2018  9:19 AM

## 2018-04-04 NOTE — PLAN OF CARE
Problem: Pain, Acute (Adult)  Goal: Identify Related Risk Factors and Signs and Symptoms  Outcome: Ongoing (interventions implemented as appropriate)   04/04/18 0113   Pain, Acute   Related Risk Factors (Acute Pain) persistent pain   Signs and Symptoms (Acute Pain) verbalization of pain descriptors

## 2018-04-04 NOTE — PLAN OF CARE
Problem: Fall Risk (Adult)  Goal: Absence of Falls  Outcome: Ongoing (interventions implemented as appropriate)   04/04/18 0115   Fall Risk (Adult)   Absence of Falls making progress toward outcome

## 2018-04-05 NOTE — UM PHYSICIAN REVIEW NOTE
Outpatient services are appropriate for this 86 yo female admitted for TKA and discharged after 1 midnight without complications.

## 2018-11-05 ENCOUNTER — APPOINTMENT (RX ONLY)
Dept: URBAN - METROPOLITAN AREA CLINIC 26 | Facility: CLINIC | Age: 84
Setting detail: DERMATOLOGY
End: 2018-11-05

## 2018-11-05 DIAGNOSIS — L72.8 OTHER FOLLICULAR CYSTS OF THE SKIN AND SUBCUTANEOUS TISSUE: ICD-10-CM

## 2018-11-05 DIAGNOSIS — D22 MELANOCYTIC NEVI: ICD-10-CM

## 2018-11-05 DIAGNOSIS — L82.1 OTHER SEBORRHEIC KERATOSIS: ICD-10-CM

## 2018-11-05 DIAGNOSIS — D18.0 HEMANGIOMA: ICD-10-CM

## 2018-11-05 DIAGNOSIS — L81.4 OTHER MELANIN HYPERPIGMENTATION: ICD-10-CM

## 2018-11-05 DIAGNOSIS — L72.0 EPIDERMAL CYST: ICD-10-CM

## 2018-11-05 PROBLEM — D22.9 MELANOCYTIC NEVI, UNSPECIFIED: Status: ACTIVE | Noted: 2018-11-05

## 2018-11-05 PROBLEM — D18.01 HEMANGIOMA OF SKIN AND SUBCUTANEOUS TISSUE: Status: ACTIVE | Noted: 2018-11-05

## 2018-11-05 PROBLEM — E78.5 HYPERLIPIDEMIA, UNSPECIFIED: Status: ACTIVE | Noted: 2018-11-05

## 2018-11-05 PROBLEM — R23.3 SPONTANEOUS ECCHYMOSES: Status: ACTIVE | Noted: 2018-11-05

## 2018-11-05 PROBLEM — I10 ESSENTIAL (PRIMARY) HYPERTENSION: Status: ACTIVE | Noted: 2018-11-05

## 2018-11-05 PROBLEM — E05.90 THYROTOXICOSIS, UNSPECIFIED WITHOUT THYROTOXIC CRISIS OR STORM: Status: ACTIVE | Noted: 2018-11-05

## 2018-11-05 PROCEDURE — 99203 OFFICE O/P NEW LOW 30 MIN: CPT

## 2018-11-05 PROCEDURE — ? COUNSELING

## 2018-11-05 ASSESSMENT — LOCATION SIMPLE DESCRIPTION DERM
LOCATION SIMPLE: RIGHT ANTERIOR NECK
LOCATION SIMPLE: SCALP
LOCATION SIMPLE: ABDOMEN

## 2018-11-05 ASSESSMENT — LOCATION DETAILED DESCRIPTION DERM
LOCATION DETAILED: XIPHOID
LOCATION DETAILED: RIGHT CLAVICULAR NECK
LOCATION DETAILED: RIGHT SUPERIOR PARIETAL SCALP

## 2018-11-05 ASSESSMENT — LOCATION ZONE DERM
LOCATION ZONE: SCALP
LOCATION ZONE: TRUNK
LOCATION ZONE: NECK

## (undated) DEVICE — Device

## (undated) DEVICE — STOCKING ANTI-EM LG THIGH REG

## (undated) DEVICE — WRAP COBAN LATEX FREE 4IN STERILE

## (undated) DEVICE — MANIFOLD SINGLE PORT NEPTUNE

## (undated) DEVICE — BLADE RECIPRICATOR DUAL CUT

## (undated) DEVICE — GOWN SURG X-LARGE MICROCOOL

## (undated) DEVICE — APPLICATOR CHLORAPREP 26ML ORANGE TINT

## (undated) DEVICE — HOOD STERISHIELD

## (undated) DEVICE — DRAPE EXTREMITY UNIVERSAL

## (undated) DEVICE — STIRRUP STRAP DISPOSABLE

## (undated) DEVICE — SOLN IRRIG .9%SOD 1000ML

## (undated) DEVICE — ***USE 121398***PACK TOTAL KNEE CUSTOM

## (undated) DEVICE — MIX-EVAC HIGH VACUUM KIT

## (undated) DEVICE — PAD GROUND ELECTROSURGICAL W/CORD

## (undated) DEVICE — SUTURE STRATAFIX PGA 3-0 FS-1 CUTTING 30CM

## (undated) DEVICE — BATTERY SYSTEM 7

## (undated) DEVICE — SUCTION 18FR FRAZIER DISPOSABLE

## (undated) DEVICE — TUBE SUCTION 1/4INX20FT STERILE

## (undated) DEVICE — GLOVE SURG PROTEXIS PF 8.5

## (undated) DEVICE — SOLN IV 0.9% NSS 1000ML

## (undated) DEVICE — SUTURE MONOCRYL 2-0 Y266H

## (undated) DEVICE — BLADE RECIPROCATING 1MM

## (undated) DEVICE — GLOVE SURG PROTEXIS PF 8

## (undated) DEVICE — ADHESIVE SKIN DERMABOND ADVANCED 0.7ML

## (undated) DEVICE — BLADE SAGITTAL DUAL CUT 4118-127-90

## (undated) DEVICE — SCREWS FIXATION HEADED (ZIMMER)
Type: IMPLANTABLE DEVICE | Status: NON-FUNCTIONAL
Removed: 2018-04-03

## (undated) DEVICE — MANIFOLD FOUR PORT NEPTUNE

## (undated) DEVICE — SUTURE QUILL 2 PDO RX-2066Q